# Patient Record
Sex: FEMALE | Race: WHITE | Employment: OTHER | URBAN - METROPOLITAN AREA
[De-identification: names, ages, dates, MRNs, and addresses within clinical notes are randomized per-mention and may not be internally consistent; named-entity substitution may affect disease eponyms.]

---

## 2017-12-07 ENCOUNTER — HOSPITAL ENCOUNTER (EMERGENCY)
Age: 82
Discharge: HOME | End: 2017-12-07
Attending: EMERGENCY MEDICINE
Payer: COMMERCIAL

## 2017-12-07 ENCOUNTER — APPOINTMENT (OUTPATIENT)
Dept: GENERAL RADIOLOGY | Age: 82
End: 2017-12-07
Attending: EMERGENCY MEDICINE
Payer: COMMERCIAL

## 2017-12-07 VITALS
RESPIRATION RATE: 20 BRPM | BODY MASS INDEX: 26.58 KG/M2 | DIASTOLIC BLOOD PRESSURE: 73 MMHG | OXYGEN SATURATION: 98 % | HEIGHT: 63 IN | HEART RATE: 99 BPM | TEMPERATURE: 97.6 F | WEIGHT: 150 LBS | SYSTOLIC BLOOD PRESSURE: 174 MMHG

## 2017-12-07 DIAGNOSIS — S52.501A CLOSED FRACTURE OF DISTAL END OF RIGHT RADIUS, UNSPECIFIED FRACTURE MORPHOLOGY, INITIAL ENCOUNTER: Primary | ICD-10-CM

## 2017-12-07 DIAGNOSIS — S52.614A CLOSED NONDISPLACED FRACTURE OF STYLOID PROCESS OF RIGHT ULNA, INITIAL ENCOUNTER: ICD-10-CM

## 2017-12-07 PROCEDURE — 73110 X-RAY EXAM OF WRIST: CPT

## 2017-12-07 PROCEDURE — 99284 EMERGENCY DEPT VISIT MOD MDM: CPT | Performed by: EMERGENCY MEDICINE

## 2017-12-07 PROCEDURE — 77030012903 HC TAPE CST 3M -A

## 2017-12-07 PROCEDURE — 75810000053 HC SPLINT APPLICATION: Performed by: EMERGENCY MEDICINE

## 2017-12-07 PROCEDURE — 74011250637 HC RX REV CODE- 250/637: Performed by: EMERGENCY MEDICINE

## 2017-12-07 RX ORDER — ACETAMINOPHEN 325 MG/1
650 TABLET ORAL
Status: COMPLETED | OUTPATIENT
Start: 2017-12-07 | End: 2017-12-07

## 2017-12-07 RX ORDER — METOPROLOL SUCCINATE 100 MG/1
50 TABLET, EXTENDED RELEASE ORAL DAILY
COMMUNITY
End: 2017-12-13

## 2017-12-07 RX ORDER — AMLODIPINE BESYLATE 2.5 MG/1
2.5 TABLET ORAL DAILY
COMMUNITY

## 2017-12-07 RX ORDER — MORPHINE SULFATE 15 MG/1
7.5 TABLET ORAL
Qty: 6 TAB | Refills: 0 | Status: SHIPPED | OUTPATIENT
Start: 2017-12-07 | End: 2017-12-13

## 2017-12-07 RX ADMIN — ACETAMINOPHEN 650 MG: 325 TABLET ORAL at 20:53

## 2017-12-08 NOTE — ED NOTES
I have reviewed discharge instructions with the patient. The patient verbalized understanding. Patient left ED via Discharge Method: ambulatory to Home with (son). Opportunity for questions and clarification provided. Patient given 1 scripts. To continue your aftercare when you leave the hospital, you may receive an automated call from our care team to check in on how you are doing. This is a free service and part of our promise to provide the best care and service to meet your aftercare needs.  If you have questions, or wish to unsubscribe from this service please call [phone]. Thank you for Choosing our [facility].

## 2017-12-08 NOTE — DISCHARGE INSTRUCTIONS
Broken Wrist: Care Instructions  Your Care Instructions    Your wrist can break, or fracture, during sports, a fall, or other accidents. The break may happen when your wrist is hit or is used to protect you in a fall. Fractures can range from a small, hairline crack, to a bone or bones broken into two or more pieces. Your treatment depends on how bad the break is. Your doctor may have put your wrist in a cast or splint. This will help keep your wrist stable until your follow-up appointment. It may take weeks or months for your wrist to heal. You can help it heal with care at home. You heal best when you take good care of yourself. Eat a variety of healthy foods, and don't smoke. Follow-up care is a key part of your treatment and safety. Be sure to make and go to all appointments, and call your doctor if you are having problems. It's also a good idea to know your test results and keep a list of the medicines you take. How can you care for yourself at home? · Put ice or a cold pack on your wrist for 10 to 20 minutes at a time. Try to do this every 1 to 2 hours for the next 3 days (when you are awake). Put a thin cloth between the ice and your cast or splint. Keep your cast or splint dry. · Follow the splint or cast care instructions your doctor gives you. If you have a splint, do not take it off unless your doctor tells you to. Be careful not to put the splint on too tight. · Be safe with medicines. Take pain medicines exactly as directed. ¨ If the doctor gave you a prescription medicine for pain, take it as prescribed. ¨ If you are not taking a prescription pain medicine, ask your doctor if you can take an over-the-counter medicine. · Prop up your wrist on pillows when you sit or lie down in the first few days after the injury. Keep your wrist higher than the level of your heart. This will help reduce swelling.   · Move your fingers often to reduce swelling and stiffness, but do not use that hand to grab or carry anything. · Follow instructions for exercises to keep your arm strong. When should you call for help? Call your doctor now or seek immediate medical care if:  ? · You have new or worse pain. ? · Your hand or fingers are cool or pale or change color. ? · Your cast or splint feels too tight. ? · You have tingling, weakness, or numbness in your hand or fingers. ? Watch closely for changes in your health, and be sure to contact your doctor if:  ? · You do not get better as expected. ? · You have problems with your cast or splint. Where can you learn more? Go to http://devon-marivel.info/. Enter [id number] in the search box to learn more about \"Broken Wrist: Care Instructions. \"  Current as of: March 21, 2017  Content Version: 11.4  © 7865-5196 [employer]. Care instructions adapted under license by [employer] (which disclaims liability or warranty for this information). If you have questions about a medical condition or this instruction, always ask your healthcare professional. [address] any warranty or liability for your use of this information.

## 2017-12-08 NOTE — ED PROVIDER NOTES
HPI Comments: Patient's  tripped and fell while using his cane and not her down. She felt her right side and injured her right wrist.  She denies other injuries. No head injury or loss of consciousness. Patient is a 80 y.o. female presenting with wrist pain. The history is provided by the patient. Wrist Pain    This is a new problem. The problem occurs constantly. The problem has not changed since onset. The pain is present in the right wrist. The quality of the pain is described as aching. The pain is moderate. Pertinent negatives include no numbness, no tingling, no back pain and no neck pain. Past Medical History:   Diagnosis Date    Hypertension        History reviewed. No pertinent surgical history. History reviewed. No pertinent family history. Social History     Social History    Marital status:      Spouse name: N/A    Number of children: N/A    Years of education: N/A     Occupational History    Not on file. Social History Main Topics    Smoking status: Not on file    Smokeless tobacco: Not on file    Alcohol use Not on file    Drug use: Not on file    Sexual activity: Not on file     Other Topics Concern    Not on file     Social History Narrative    No narrative on file         ALLERGIES: Review of patient's allergies indicates no known allergies. Review of Systems   HENT: Negative for facial swelling. Respiratory: Negative for shortness of breath. Cardiovascular: Negative for chest pain. Gastrointestinal: Negative for nausea and vomiting. Musculoskeletal: Negative for back pain and neck pain. Neurological: Negative for tingling, weakness, numbness and headaches (no loss of consciousness). Vitals:    12/07/17 1913   BP: 174/73   Pulse: 99   Resp: 20   Temp: 97.6 °F (36.4 °C)   SpO2: 98%   Weight: 68 kg (150 lb)   Height: 5' 3\" (1.6 m)            Physical Exam   Constitutional: She is oriented to person, place, and time.  She appears well-developed and well-nourished. HENT:   Head: Normocephalic and atraumatic. Eyes: Pupils are equal, round, and reactive to light. Neck: Trachea normal. No spinous process tenderness and no muscular tenderness present. Cardiovascular: Normal rate, regular rhythm, normal heart sounds and intact distal pulses. Pulmonary/Chest: Effort normal and breath sounds normal. She exhibits no tenderness. Abdominal: Soft. Bowel sounds are normal. She exhibits no distension. There is no tenderness. There is no rebound and no guarding. Musculoskeletal: Normal range of motion. She exhibits tenderness (tenderness over distal radius and ulna with mildswelling noted. Neurovascularly intact. 2+ radial pulse. ). She exhibits no edema. Cervical back: She exhibits tenderness. Neurological: She is alert and oriented to person, place, and time. She has normal strength. No sensory deficit. GCS eye subscore is 4. GCS verbal subscore is 5. GCS motor subscore is 6. Skin: Skin is warm and dry. Nursing note and vitals reviewed. MDM  Number of Diagnoses or Management Options  Diagnosis management comments: Right distal radius fracture and ulnar styloid fracture. Patient splinted in a sugar tong splint. Patient to follow-up with Dr. Radha Schultz or with [facility] orthopedics. I spoke with Dr. Castillo Clark regarding her follow-up. She is seen Dr. Radha Schultz in the past or at least a family member has name may prefer to go there instead. Amount and/or Complexity of Data Reviewed  Tests in the radiology section of CPT®: ordered and reviewed (Xr Wrist Rt Ap/lat/obl Min 3v    Result Date: 12/7/2017  RIGHT WRIST SERIES HISTORY: Severe right wrist pain after fall today FINDINGS: There is comminuted fracture of the distal radius with extension into the radiocarpal joint. There is an accompanying fracture of the ulnar styloid.      IMPRESSION: Fractures of the distal radius and ulnar styloid.    )      ED Course Procedures

## 2017-12-13 ENCOUNTER — HOSPITAL ENCOUNTER (OUTPATIENT)
Dept: SURGERY | Age: 82
Discharge: HOME | End: 2017-12-13

## 2017-12-13 VITALS — HEIGHT: 60 IN | WEIGHT: 140 LBS | BODY MASS INDEX: 27.48 KG/M2

## 2017-12-13 RX ORDER — ASPIRIN 81 MG/1
81 TABLET ORAL DAILY
COMMUNITY

## 2017-12-13 RX ORDER — METOPROLOL SUCCINATE 50 MG/1
50 TABLET, EXTENDED RELEASE ORAL DAILY
COMMUNITY

## 2017-12-13 RX ORDER — CHOLECALCIFEROL (VITAMIN D3) 125 MCG
2000 CAPSULE ORAL DAILY
COMMUNITY

## 2017-12-13 NOTE — PERIOP NOTES
Patient verified name, , and surgery as listed in Connect Care. Patient provided medical/health information and PTA medications to the best of their ability. TYPE  CASE: 1B      Orders: No Orders noted in connect. Labs per surgeon:  None   Labs per anesthesia protocol: None  EKG  :  NA    Instructed patient to continue previous medications as prescribed prior to surgery unless otherwise directed and to take the following medications the day of surgery according to anesthesia guidelines : Norvasc,Aspirin 81mg, Toprol . Instructed patient to hold  the following medications: Vitamin D and CO Q10. Patient instructed on the following:  Arrive at main  entrance, time of arrival to be called the day before by 1700. Responsible adult must drive patient to and from the hospital, stay during surgery, and  patient will need supervision 24 hours after anesthesia  NPO after midnight including gum, mints and ice chips. Shower the night before and the morning of surgery with a non-moisturizing soap. Leave all valuables at home. Bring insurance card and ID. No jewelry or body piercings on the dos. No perfumes, oil, powder, colognes, makeup or  lotions on the skin. Patient teach back successful and patient demonstrates knowledge of instruction.

## 2017-12-15 ENCOUNTER — ANESTHESIA EVENT (OUTPATIENT)
Dept: SURGERY | Age: 82
End: 2017-12-15
Payer: COMMERCIAL

## 2017-12-16 ENCOUNTER — APPOINTMENT (OUTPATIENT)
Dept: GENERAL RADIOLOGY | Age: 82
End: 2017-12-16
Attending: ORTHOPAEDIC SURGERY
Payer: COMMERCIAL

## 2017-12-16 ENCOUNTER — ANESTHESIA (OUTPATIENT)
Dept: SURGERY | Age: 82
End: 2017-12-16
Payer: COMMERCIAL

## 2017-12-16 ENCOUNTER — HOSPITAL ENCOUNTER (OUTPATIENT)
Age: 82
Setting detail: OUTPATIENT SURGERY
Discharge: HOME | End: 2017-12-16
Attending: ORTHOPAEDIC SURGERY | Admitting: ORTHOPAEDIC SURGERY
Payer: COMMERCIAL

## 2017-12-16 VITALS
BODY MASS INDEX: 32.26 KG/M2 | SYSTOLIC BLOOD PRESSURE: 139 MMHG | OXYGEN SATURATION: 95 % | DIASTOLIC BLOOD PRESSURE: 58 MMHG | RESPIRATION RATE: 17 BRPM | TEMPERATURE: 97.8 F | HEIGHT: 60 IN | HEART RATE: 70 BPM | WEIGHT: 164.31 LBS

## 2017-12-16 DIAGNOSIS — S62.101A CLOSED FRACTURE OF RIGHT WRIST, INITIAL ENCOUNTER: Primary | ICD-10-CM

## 2017-12-16 PROCEDURE — C1713 ANCHOR/SCREW BN/BN,TIS/BN: HCPCS | Performed by: ORTHOPAEDIC SURGERY

## 2017-12-16 PROCEDURE — 74011000250 HC RX REV CODE- 250

## 2017-12-16 PROCEDURE — 74011250637 HC RX REV CODE- 250/637: Performed by: ANESTHESIOLOGY

## 2017-12-16 PROCEDURE — 77030002916 HC SUT ETHLN J&J -A: Performed by: ORTHOPAEDIC SURGERY

## 2017-12-16 PROCEDURE — 77030018836 HC SOL IRR NACL ICUM -A: Performed by: ORTHOPAEDIC SURGERY

## 2017-12-16 PROCEDURE — 76942 ECHO GUIDE FOR BIOPSY: CPT | Performed by: ORTHOPAEDIC SURGERY

## 2017-12-16 PROCEDURE — 74011250636 HC RX REV CODE- 250/636: Performed by: ANESTHESIOLOGY

## 2017-12-16 PROCEDURE — 74011250636 HC RX REV CODE- 250/636: Performed by: NURSE PRACTITIONER

## 2017-12-16 PROCEDURE — 77030011884 HC TAPE CST PLSTR BSNM -A: Performed by: ORTHOPAEDIC SURGERY

## 2017-12-16 PROCEDURE — 74011250636 HC RX REV CODE- 250/636

## 2017-12-16 PROCEDURE — 77030011640 HC PAD GRND REM COVD -A: Performed by: ORTHOPAEDIC SURGERY

## 2017-12-16 PROCEDURE — 76210000006 HC OR PH I REC 0.5 TO 1 HR: Performed by: ORTHOPAEDIC SURGERY

## 2017-12-16 PROCEDURE — 73100 X-RAY EXAM OF WRIST: CPT

## 2017-12-16 PROCEDURE — 77030000031 HC BIT DRL QC SYNT -C: Performed by: ORTHOPAEDIC SURGERY

## 2017-12-16 PROCEDURE — 73110 X-RAY EXAM OF WRIST: CPT

## 2017-12-16 PROCEDURE — 77030003602 HC NDL NRV BLK BBMI -B: Performed by: ANESTHESIOLOGY

## 2017-12-16 PROCEDURE — 76210000020 HC REC RM PH II FIRST 0.5 HR: Performed by: ORTHOPAEDIC SURGERY

## 2017-12-16 PROCEDURE — 76010010054 HC POST OP PAIN BLOCK: Performed by: ORTHOPAEDIC SURGERY

## 2017-12-16 PROCEDURE — 77030002933 HC SUT MCRYL J&J -A: Performed by: ORTHOPAEDIC SURGERY

## 2017-12-16 PROCEDURE — 76060000033 HC ANESTHESIA 1 TO 1.5 HR: Performed by: ORTHOPAEDIC SURGERY

## 2017-12-16 PROCEDURE — 76010000161 HC OR TIME 1 TO 1.5 HR INTENSV-TIER 1: Performed by: ORTHOPAEDIC SURGERY

## 2017-12-16 DEVICE — SCREW BNE L12MM DIA24MM CORT S STL ST T8 STARDRV RECESS: Type: IMPLANTABLE DEVICE | Site: RADIUS | Status: FUNCTIONAL

## 2017-12-16 DEVICE — SCREW BNE L16MM DIA2.4MM S STL ST LOK FULL THRD T8 STARDRV: Type: IMPLANTABLE DEVICE | Site: RADIUS | Status: FUNCTIONAL

## 2017-12-16 DEVICE — SCREW BNE L22MM DIA2.4MM S STL ST LOK FULL THRD T8 STARDRV: Type: IMPLANTABLE DEVICE | Site: RADIUS | Status: FUNCTIONAL

## 2017-12-16 DEVICE — SCREW BNE L12MM DIA2.4MM S STL ST LOK FULL THRD T8 STARDRV: Type: IMPLANTABLE DEVICE | Site: RADIUS | Status: FUNCTIONAL

## 2017-12-16 DEVICE — IMPLANTABLE DEVICE: Type: IMPLANTABLE DEVICE | Site: RADIUS | Status: FUNCTIONAL

## 2017-12-16 DEVICE — SCREW BNE L18MM DIA2.4MM S STL ST LOK FULL THRD T8 STARDRV: Type: IMPLANTABLE DEVICE | Site: RADIUS | Status: FUNCTIONAL

## 2017-12-16 RX ORDER — ROPIVACAINE HYDROCHLORIDE 5 MG/ML
INJECTION, SOLUTION EPIDURAL; INFILTRATION; PERINEURAL AS NEEDED
Status: DISCONTINUED | OUTPATIENT
Start: 2017-12-16 | End: 2017-12-16 | Stop reason: HOSPADM

## 2017-12-16 RX ORDER — CEFAZOLIN SODIUM/WATER 2 G/20 ML
2 SYRINGE (ML) INTRAVENOUS
Status: COMPLETED | OUTPATIENT
Start: 2017-12-16 | End: 2017-12-16

## 2017-12-16 RX ORDER — LIDOCAINE HYDROCHLORIDE 20 MG/ML
INJECTION, SOLUTION EPIDURAL; INFILTRATION; INTRACAUDAL; PERINEURAL AS NEEDED
Status: DISCONTINUED | OUTPATIENT
Start: 2017-12-16 | End: 2017-12-16 | Stop reason: HOSPADM

## 2017-12-16 RX ORDER — SODIUM CHLORIDE 0.9 % (FLUSH) 0.9 %
5-10 SYRINGE (ML) INJECTION AS NEEDED
Status: DISCONTINUED | OUTPATIENT
Start: 2017-12-16 | End: 2017-12-16 | Stop reason: HOSPADM

## 2017-12-16 RX ORDER — MIDAZOLAM HYDROCHLORIDE 1 MG/ML
2 INJECTION, SOLUTION INTRAMUSCULAR; INTRAVENOUS ONCE
Status: DISCONTINUED | OUTPATIENT
Start: 2017-12-16 | End: 2017-12-16 | Stop reason: HOSPADM

## 2017-12-16 RX ORDER — ACETAMINOPHEN 500 MG
1000 TABLET ORAL ONCE
Status: COMPLETED | OUTPATIENT
Start: 2017-12-16 | End: 2017-12-16

## 2017-12-16 RX ORDER — ALBUTEROL SULFATE 0.83 MG/ML
2.5 SOLUTION RESPIRATORY (INHALATION) AS NEEDED
Status: DISCONTINUED | OUTPATIENT
Start: 2017-12-16 | End: 2017-12-16 | Stop reason: HOSPADM

## 2017-12-16 RX ORDER — OXYCODONE HYDROCHLORIDE 5 MG/1
10 TABLET ORAL
Status: DISCONTINUED | OUTPATIENT
Start: 2017-12-16 | End: 2017-12-16 | Stop reason: HOSPADM

## 2017-12-16 RX ORDER — SODIUM CHLORIDE, SODIUM LACTATE, POTASSIUM CHLORIDE, CALCIUM CHLORIDE 600; 310; 30; 20 MG/100ML; MG/100ML; MG/100ML; MG/100ML
100 INJECTION, SOLUTION INTRAVENOUS CONTINUOUS
Status: DISCONTINUED | OUTPATIENT
Start: 2017-12-16 | End: 2017-12-16 | Stop reason: HOSPADM

## 2017-12-16 RX ORDER — ONDANSETRON 2 MG/ML
4 INJECTION INTRAMUSCULAR; INTRAVENOUS
Status: DISCONTINUED | OUTPATIENT
Start: 2017-12-16 | End: 2017-12-16 | Stop reason: HOSPADM

## 2017-12-16 RX ORDER — HYDROMORPHONE HYDROCHLORIDE 2 MG/ML
0.5 INJECTION, SOLUTION INTRAMUSCULAR; INTRAVENOUS; SUBCUTANEOUS
Status: DISCONTINUED | OUTPATIENT
Start: 2017-12-16 | End: 2017-12-16 | Stop reason: HOSPADM

## 2017-12-16 RX ORDER — SODIUM CHLORIDE 0.9 % (FLUSH) 0.9 %
5-10 SYRINGE (ML) INJECTION EVERY 8 HOURS
Status: DISCONTINUED | OUTPATIENT
Start: 2017-12-16 | End: 2017-12-16 | Stop reason: HOSPADM

## 2017-12-16 RX ORDER — PROPOFOL 10 MG/ML
INJECTION, EMULSION INTRAVENOUS
Status: DISCONTINUED | OUTPATIENT
Start: 2017-12-16 | End: 2017-12-16 | Stop reason: HOSPADM

## 2017-12-16 RX ORDER — HYDROMORPHONE HYDROCHLORIDE 2 MG/ML
0.5 INJECTION, SOLUTION INTRAMUSCULAR; INTRAVENOUS; SUBCUTANEOUS
Status: DISCONTINUED | OUTPATIENT
Start: 2017-12-16 | End: 2017-12-16 | Stop reason: SDUPTHER

## 2017-12-16 RX ORDER — LIDOCAINE HYDROCHLORIDE 10 MG/ML
0.1 INJECTION INFILTRATION; PERINEURAL AS NEEDED
Status: DISCONTINUED | OUTPATIENT
Start: 2017-12-16 | End: 2017-12-16 | Stop reason: HOSPADM

## 2017-12-16 RX ORDER — SODIUM CHLORIDE, SODIUM LACTATE, POTASSIUM CHLORIDE, CALCIUM CHLORIDE 600; 310; 30; 20 MG/100ML; MG/100ML; MG/100ML; MG/100ML
1000 INJECTION, SOLUTION INTRAVENOUS CONTINUOUS
Status: DISCONTINUED | OUTPATIENT
Start: 2017-12-16 | End: 2017-12-16 | Stop reason: HOSPADM

## 2017-12-16 RX ORDER — PROPOFOL 10 MG/ML
INJECTION, EMULSION INTRAVENOUS AS NEEDED
Status: DISCONTINUED | OUTPATIENT
Start: 2017-12-16 | End: 2017-12-16 | Stop reason: HOSPADM

## 2017-12-16 RX ORDER — SODIUM CHLORIDE, SODIUM LACTATE, POTASSIUM CHLORIDE, CALCIUM CHLORIDE 600; 310; 30; 20 MG/100ML; MG/100ML; MG/100ML; MG/100ML
75 INJECTION, SOLUTION INTRAVENOUS
Status: DISCONTINUED | OUTPATIENT
Start: 2017-12-16 | End: 2017-12-16 | Stop reason: HOSPADM

## 2017-12-16 RX ADMIN — PROPOFOL 100 MG: 10 INJECTION, EMULSION INTRAVENOUS at 08:19

## 2017-12-16 RX ADMIN — SODIUM CHLORIDE, SODIUM LACTATE, POTASSIUM CHLORIDE, AND CALCIUM CHLORIDE: 600; 310; 30; 20 INJECTION, SOLUTION INTRAVENOUS at 08:10

## 2017-12-16 RX ADMIN — ROPIVACAINE HYDROCHLORIDE 30 ML: 5 INJECTION, SOLUTION EPIDURAL; INFILTRATION; PERINEURAL at 07:30

## 2017-12-16 RX ADMIN — PROPOFOL 250 MCG/KG/MIN: 10 INJECTION, EMULSION INTRAVENOUS at 08:19

## 2017-12-16 RX ADMIN — Medication 2 G: at 08:21

## 2017-12-16 RX ADMIN — ACETAMINOPHEN 1000 MG: 500 TABLET, FILM COATED ORAL at 06:45

## 2017-12-16 RX ADMIN — SODIUM CHLORIDE, SODIUM LACTATE, POTASSIUM CHLORIDE, AND CALCIUM CHLORIDE 75 ML/HR: 600; 310; 30; 20 INJECTION, SOLUTION INTRAVENOUS at 06:41

## 2017-12-16 RX ADMIN — LIDOCAINE HYDROCHLORIDE 20 MG: 20 INJECTION, SOLUTION EPIDURAL; INFILTRATION; INTRACAUDAL; PERINEURAL at 08:19

## 2017-12-16 NOTE — DISCHARGE INSTRUCTIONS
NON-WEIGHT BEARING RIGHT ARM  NO LIFTING WITH RIGHT ARM  ELEVATE RIGHT ARM  RIGHT ARM SLING  MOVE FINGERS FREELY  KEEP DRESSING CLEAN AND DRY  FOLLOW-UP APPT WITH DR Camila Patterson - 12/26/17 @ 8:30 AM  PRESCRIPTION FOR OXYCODONE 5 MG GIVEN TO PATIENT    After general anesthesia or intravenous sedation, for 24 hours or while taking prescription Narcotics:  · Limit your activities  · Do not drive and operate hazardous machinery  · Do not make important personal or business decisions  · Do  not drink alcoholic beverages  · If you have not urinated within 8 hours after discharge, please contact your surgeon on call. *  Please give a list of your current medications to your Primary Care Provider. *  Please update this list whenever your medications are discontinued, doses are      changed, or new medications (including over-the-counter products) are added. *  Please carry medication information at all times in case of emergency situations. These are general instructions for a healthy lifestyle:  No smoking/ No tobacco products/ Avoid exposure to second hand smoke  Surgeon General's Warning:  Quitting smoking now greatly reduces serious risk to your health. Obesity, smoking, and sedentary lifestyle greatly increases your risk for illness  A healthy diet, regular physical exercise & weight monitoring are important for maintaining a healthy lifestyle    You may be retaining fluid if you have a history of heart failure or if you experience any of the following symptoms:  Weight gain of 3 pounds or more overnight or 5 pounds in a week, increased swelling in our hands or feet or shortness of breath while lying flat in bed. Please call your doctor as soon as you notice any of these symptoms; do not wait until your next office visit.     Recognize signs and symptoms of STROKE:    F-face looks uneven    A-arms unable to move or move unevenly    S-speech slurred or non-existent    T-time-call 911 as soon as signs and symptoms begin-DO NOT go       Back to bed or wait to see if you get better-TIME IS BRAIN.

## 2017-12-16 NOTE — H&P
Outpatient Surgery History and Physical      Tanisha Hilliard was seen and examined. Chief Complaint:   RIGHT WRIST PAIN     Physical Exam:   There were no vitals taken for this visit. Heart:   Regular rhythm      Lungs:  Are clear      History:  Past Medical History:   Diagnosis Date    Arthritis     Hypertension     controlled well with meds    Right arm pain       Past Surgical History:   Procedure Laterality Date    HX OPEN CHOLECYSTECTOMY       Family History   Problem Relation Age of Onset    Heart Disease Mother     Hypertension Mother     Heart Disease Father     Hypertension Father     Stroke Father     Cancer Sister      Lymphoma    Cancer Brother      Lung CA    Heart Disease Sister     Stroke Brother       Social History     Occupational History    Not on file. Social History Main Topics    Smoking status: Never Smoker    Smokeless tobacco: Never Used    Alcohol use No    Drug use: No    Sexual activity: Not on file       Allergies: Reviewed per EMR  No Known Allergies    Medications:    Prior to Admission medications    Medication Sig Start Date End Date Taking? Authorizing Provider   garlic cap Take  by mouth. Indications: fruit and veggie vitamin with garlic- held 22/05/74    Historical Provider   acetaminophen (TYLENOL EXTRA STRENGTH) 500 mg tablet Take 500 mg by mouth every six (6) hours as needed for Pain. Historical Provider   metoprolol succinate (TOPROL XL) 50 mg XL tablet Take 50 mg by mouth daily. Indications: am- take on the [employer] Provider   UBIDECARENONE (CO Q-10 PO) Take 1 Tab by mouth daily. Indications: held 12/13/17    Historical Provider   aspirin delayed-release 81 mg tablet Take 81 mg by mouth daily. Indications: am- take on the [employer] Provider   cholecalciferol, vitamin D3, (VITAMIN D3) 2,000 unit tab Take 2,000 Units by mouth daily.  Indications: held 12/13/17    Historical Provider   amLODIPine (NORVASC) 2.5 mg tablet Take 2.5 mg by mouth daily. Indications: am- take on the [facility]        The surgery is planned for the OPEN REDUCTION INTERNAL FIXATION OF RIGHT WRIST. The patient is here today for outpatient surgery. I have examined the patient, no changes are noted in the patient's medical status. Necessity for the procedure/care is still present and the history and physical above is current.       Signed By: Alex Mar NP     December 15, 2017 10:12 PM

## 2017-12-16 NOTE — ANESTHESIA PREPROCEDURE EVALUATION
Anesthetic History   No history of anesthetic complications            Review of Systems / Medical History  Patient summary reviewed and pertinent labs reviewed    Pulmonary  Within defined limits                 Neuro/Psych   Within defined limits           Cardiovascular    Hypertension              Exercise tolerance: >4 METS     GI/Hepatic/Renal                Endo/Other        Obesity and arthritis     Other Findings   Comments: Works at [employer]         Physical Exam    Airway  Mallampati: II  TM Distance: 4 - 6 cm  Neck ROM: normal range of motion   Mouth opening: Normal     Cardiovascular    Rhythm: regular  Rate: normal  Peripheral edema (+1 in legs and calves)    Pertinent negatives: No murmur   Dental  No notable dental hx       Pulmonary  Breath sounds clear to auscultation               Abdominal         Other Findings            Anesthetic Plan    ASA: 2  Anesthesia type: total IV anesthesia      Post-op pain plan if not by surgeon: peripheral nerve block single    Induction: Intravenous  Anesthetic plan and risks discussed with: Patient and Son / Daughter

## 2017-12-16 NOTE — ANESTHESIA POSTPROCEDURE EVALUATION
Post-Anesthesia Evaluation and Assessment    Patient: Candida Barney MRN: [id number]  SSN: [id number]    YOB: 1934  Age: 80 y.o. Sex: female       Cardiovascular Function/Vital Signs  Visit Vitals    /73    Pulse 73    Temp 36.6 °C (97.8 °F)    Resp 16    Ht 5' (1.524 m)    Wt 74.5 kg (164 lb 5 oz)    SpO2 93%    BMI 32.09 kg/m2       Patient is status post total IV anesthesia anesthesia for Procedure(s):  RIGHT 2 INTRA ARTICULAR DISTAL RADIUS OPEN REDUCTION INTERNAL FIXATION/ CHOICE. Nausea/Vomiting: None    Postoperative hydration reviewed and adequate. Pain:  Pain Scale 1: Numeric (0 - 10) (12/16/17 0931)  Pain Intensity 1: 0 (12/16/17 0931)   Managed    Neurological Status:   Neuro (WDL): Within Defined Limits (12/16/17 0922)   At baseline    Mental Status and Level of Consciousness: Arousable    Pulmonary Status:   O2 Device: Nasal cannula (12/16/17 0922)   Adequate oxygenation and airway patent    Complications related to anesthesia: None    Post-anesthesia assessment completed.  No concerns    Signed By: Kian Ponce MD     December 16, 2017

## 2017-12-16 NOTE — IP AVS SNAPSHOT
[address] [facility] 
 
 
 [address] [facility] [address] 
[phone] Patient: Mackenzie Patterson MRN: [id number] EWY:[birth year] About your hospitalization You were admitted on:  December 16, 2017 You last received care in the:  [facility] PACU You were discharged on:  December 16, 2017 Why you were hospitalized Your primary diagnosis was:  Not on File Things You Need To Do (next 8 weeks) Follow up with Isabelle Fabian MD  
  
Phone:  [phone] Where:  [address] [facility], [address] [facility] [address] Follow up with Star Newman MD  
  
Phone:  [phone] Where:  [address] [facility] , [address], [facility] [address] Discharge Orders Procedure Order Date Status Priority Quantity Spec Type Associated Dx CALL YOUR DOCTOR For: Severe uncontrolled pain. , Redness, tenderness, or signs of infection. 12/16/17 0750 Normal Routine 1  Closed fracture of right wrist, initial encounter [[id number]] Questions: For:  Severe uncontrolled pain. For:  Redness, tenderness, or signs of infection. ACTIVITY AFTER DISCHARGE Patient should: Restrict weight bearing, Restrict lifting 12/16/17 0750 Normal Routine 1  Closed fracture of right wrist, initial encounter [[id number]] Questions: Patient should:  Restrict weight bearing Patient should:  Restrict lifting DIET REGULAR No added salt 12/16/17 0750 Normal Routine 1  Closed fracture of right wrist, initial encounter [[id number]] Questions: Additional options:  No added salt DRESSING, DO NOT REMOVE 12/16/17 0750 Normal Routine 1  Closed fracture of right wrist, initial encounter [[id number]] Comments:  Keep clean, dry and intact until next clinic visit. A check meenu indicates which time of day the medication should be taken. My Medications TAKE these medications as instructed Instructions Each Dose to Equal  
 Morning Noon Evening Bedtime  
 amLODIPine 2.5 mg tablet Commonly known as:  Jacob Rojo Your last dose was: Your next dose is: Take 2.5 mg by mouth daily. Indications: am- take on the [facility] 2.5 mg  
    
   
   
   
  
 aspirin delayed-release 81 mg tablet Your last dose was: Your next dose is: Take 81 mg by mouth daily. Indications: am- take on the [facility] 81 mg  
    
   
   
   
  
 CO Q-10 PO Your last dose was: Your next dose is: Take 1 Tab by mouth daily. Indications: held 12/13/17  
 1 Tab  
    
   
   
   
  
 garlic Cap Your last dose was: Your next dose is: Take  by mouth. Indications: fruit and veggie vitamin with garlic- held 65/21/09 TOPROL XL 50 mg XL tablet Generic drug:  metoprolol succinate Your last dose was: Your next dose is: Take 50 mg by mouth daily. Indications: am- take on the [facility] 50 mg  
    
   
   
   
  
 TYLENOL EXTRA STRENGTH 500 mg tablet Generic drug:  acetaminophen Your last dose was: Your next dose is: Take 500 mg by mouth every six (6) hours as needed for Pain. 500 mg  
    
   
   
   
  
 VITAMIN D3 2,000 unit Tab Generic drug:  cholecalciferol (vitamin D3) Your last dose was: Your next dose is: Take 2,000 Units by mouth daily. Indications: held 12/13/17  
 2000 Units Discharge Instructions NON-WEIGHT BEARING RIGHT ARM 
NO LIFTING WITH RIGHT ARM 
ELEVATE RIGHT ARM 
RIGHT ARM SLING 
MOVE FINGERS FREELY 
KEEP DRESSING CLEAN AND DRY FOLLOW-UP APPT WITH DR Mabel Neal - 12/26/17 @ 8:30 AM 
PRESCRIPTION FOR OXYCODONE 5 MG GIVEN TO PATIENT After general anesthesia or intravenous sedation, for 24 hours or while taking prescription Narcotics: · Limit your activities · Do not drive and operate hazardous machinery · Do not make important personal or business decisions · Do  not drink alcoholic beverages · If you have not urinated within 8 hours after discharge, please contact your surgeon on call. *  Please give a list of your current medications to your Primary Care Provider. *  Please update this list whenever your medications are discontinued, doses are 
    changed, or new medications (including over-the-counter products) are added. *  Please carry medication information at all times in case of emergency situations. These are general instructions for a healthy lifestyle: No smoking/ No tobacco products/ Avoid exposure to second hand smoke Surgeon General's Warning:  Quitting smoking now greatly reduces serious risk to your health. Obesity, smoking, and sedentary lifestyle greatly increases your risk for illness A healthy diet, regular physical exercise & weight monitoring are important for maintaining a healthy lifestyle You may be retaining fluid if you have a history of heart failure or if you experience any of the following symptoms:  Weight gain of 3 pounds or more overnight or 5 pounds in a week, increased swelling in our hands or feet or shortness of breath while lying flat in bed. Please call your doctor as soon as you notice any of these symptoms; do not wait until your next office visit. Recognize signs and symptoms of STROKE: 
 
F-face looks uneven A-arms unable to move or move unevenly S-speech slurred or non-existent T-time-call 911 as soon as signs and symptoms begin-DO NOT go Back to bed or wait to see if you get better-TIME IS BRAIN. Introducing [facility]! Alexander Spencer introduces [employer] patient portal. Now you can access parts of your medical record, email your doctor's office, and request medication refills online. 1. In your internet browser, go to [email or web address] 2. Click on the First Time User? Click Here link in the Sign In box. You will see the New Member Sign Up page. 3. Enter your [email or web address] Access Code exactly as it appears below. You will not need to use this code after youve completed the sign-up process. If you do not sign up before the expiration date, you must request a new code. · [email or web address] Access Code: [email or web address] Expires: 3/7/2018  6:51 PM 
 
4. Enter the last four digits of your Social Security Number (xxxx) and Date of Birth (mm/dd/yyyy) as indicated and click Submit. You will be taken to the next sign-up page. 5. Create a [email or web address] ID. This will be your [email or web address] login ID and cannot be changed, so think of one that is secure and easy to remember. 6. Create a [email or web address] password. You can change your password at any time. 7. Enter your Password Reset Question and Answer. This can be used at a later time if you forget your password. 8. Enter your e-mail address. You will receive e-mail notification when new information is available in [address] 9. Click Sign Up. You can now view and download portions of your medical record. 10. Click the Download Summary menu link to download a portable copy of your medical information. If you have questions, please visit the Frequently Asked Questions section of the [email or web address] website. Remember, [email or web address] is NOT to be used for urgent needs. For medical emergencies, dial 911. Now available from your iPhone and Android! Providers Seen During Your Hospitalization Provider Specialty Primary office phone Latonya Silva MD Orthopedic Surgery [phone] Your Primary Care Physician (PCP) Primary Care Physician Office Phone Office Fax Sumaya Thakkar [phone] You are allergic to the following No active allergies Recent Documentation Height Weight BMI OB Status Smoking Status 1.524 m 74.5 kg 32.09 kg/m2 Postmenopausal Never Smoker Emergency Contacts Name Discharge Info Relation Home Work Mobile Dimmary Figueroa  Son [22] [phone] Patient Belongings The following personal items are in your possession at time of discharge: 
  Dental Appliances: None         Home Medications: None   Jewelry: None  Clothing: Undergarments, Footwear, Shirt    Other Valuables: None Please provide this summary of care documentation to your next provider. Signatures-by signing, you are acknowledging that this After Visit Summary has been reviewed with you and you have received a copy. Patient Signature:  ____________________________________________________________ Date:  ____________________________________________________________  
  
[facility] Provider Signature:  ____________________________________________________________ Date:  ____________________________________________________________

## 2017-12-16 NOTE — ANESTHESIA PROCEDURE NOTES
Peripheral Block    Start time: 12/16/2017 7:23 AM  End time: 12/16/2017 7:30 AM  Performed by: Mann Luu  Authorized by: Mann Luu       Pre-procedure:    Indications: at surgeon's request and post-op pain management    Preanesthetic Checklist: patient identified, risks and benefits discussed, site marked, timeout performed, anesthesia consent given and patient being monitored    Timeout Time: 07:23          Block Type:   Block Type:  Axillary  Laterality:  Right  Monitoring:  Continuous pulse ox, frequent vital sign checks, heart rate, oxygen and responsive to questions  Injection Technique:  Single shot  Procedures: ultrasound guided    Patient Position: supine  Prep: chlorhexidine    Location:  Axilla  Needle Type:  Stimuplex  Needle Gauge:  20 G  Needle Localization:  Ultrasound guidance  Medication Injected:  0.5%  ropivacaine  Volume (mL):  30    Assessment:  Number of attempts:  1  Injection Assessment:  Incremental injection every 5 mL, local visualized surrounding nerve on ultrasound, negative aspiration for blood, no paresthesia, no intravascular symptoms and ultrasound image on chart  Patient tolerance:  Patient tolerated the procedure well with no immediate complications

## 2017-12-17 NOTE — BRIEF OP NOTE
BRIEF OPERATIVE NOTE    Date of Procedure: 12/16/2017   Preoperative Diagnosis: Closed die punch fracture distal radius, right, initial encounter [S57.270A]  Postoperative Diagnosis: Right 2 part intra articular distal radius fracture    Procedure(s):  RIGHT 2 INTRA ARTICULAR DISTAL RADIUS OPEN REDUCTION INTERNAL FIXATION/ CHOICE  Surgeon(s) and Role:     * Ashtyn Amaro MD - Primary         Assistant Staff:       Surgical Staff:  Circ-1: Uriel Hodges RN  Radiology Technician: Jaquelin Travis, RT, R, CT  Scrub Tech-1: Juanita Drake  Scrub Tech-2: Nolberto Christensen  Scrub Tech-3: Gamaliel Santos  Event Time In   Incision Start 8021   Incision Close 0913     Anesthesia: Regional   Estimated Blood Loss: tourniquet  Specimens: * No specimens in log *   Findings: none   Complications: none  Implants:   Implant Name Type Inv.  Item Serial No.  Lot No. LRB No. Used Action   SCR BNE LCK ST STARDRV 2.4X12 -- SS - [id number]  SCR BNE LCK ST STARDRV 2.4X12 -- SS  [employer] [place] [id number] Right 3 Implanted   SCR BNE LCK ST STARDRV 2.4X16 -- SS - [id number]  SCR BNE LCK ST STARDRV 2.4X16 -- SS  [employer] [place] [id number] Right 1 Implanted   SCR BNE LCK ST STARDRV 2.4X18 -- SS - [id number]  SCR BNE LCK ST STARDRV 2.4X18 -- SS  [employer] [place] [id number] Right 1 Implanted   SCR BNE LCK ST STARDRV 2.4X22 -- SS - [id number]  SCR BNE LCK ST STARDRV 2.4X22 -- SS  [employer] [place] [id number] Right 3 Implanted   SCR BNE CRTX ST T8 2.4X12MM SS --  - [id number]  SCR BNE CRTX ST T8 2.4X12MM SS --   [employer] [place] [id number] Right 1 Implanted   PLATE RAD DSTL 6H FV/3C SHFT/R --  - [id number]   PLATE RAD DSTL 6H UY/6S SHFT/R --    [facility] [id number] Right 1 Implanted

## 2017-12-26 NOTE — OP NOTES
[address] REPORT    Coleen Medeiros  MR#: [id number]  : 1934  ACCOUNT #: [de-identified]   DATE OF SERVICE: 2017    PREOPERATIVE DIAGNOSIS:  Right 2-part intraarticular distal radius fracture. POSTOPERATIVE DIAGNOSIS:  Right 2-part intraarticular distal radius fracture. PROCEDURE:  Open reduction and internal fixation of a right 2-part intra-articular distal radius fracture. OPERATING TEAM: Medardo Acuna. Erica Medrano MD     ANESTHESIA:  Block. SPECIMENS REMOVED:      PROCEDURE:  The patient brought to the operative suite, placed in the supine position. After adequate anesthesia achieved in the form of a block, the patient had a tourniquet applied to the right arm over adequate padding and Sof-Rol, preset to a level of 255 mmHg. Right upper extremity was then prepped and draped in the usual sterile fashion. It was elevated and exsanguinated by gravity, tourniquet was inflated. Incision was made over the volar aspect of the distal forearm along the line of the flexor carpi radialis tendon. Hemostasis achieved with Bovie cautery. Fascia incised along the line of the skin incision. The pronator quadratus and flexor pollicis longus were taken down off the radius and reflected ulnarward. The brachioradialis was released from the radial styloid. The underlying fracture is a 2-part intra-articular fracture. It is cleaned of soft tissue debris and reduced and provisionally fixed with K-wires. It is then secured with a volarly applied Synthes distal radial locking plate. Plate is taken to the bone proximally using a 2.7 cortical screw. It is then secured distally with locking screws and an additional 3.5 locking screws were then placed proximally with the original cortical screws removed. AP and lateral fluoroscopic images with an oblique confirmed the fracture reduced anatomically and the hardware was in good position.   The wound was then irrigated with normal saline and closed in layers. A sterile compressive dressing and sugar-tong splint were applied. Tourniquet was deflated and the patient was transferred to recovery room, alert and oriented under the care of anesthesia. ESTIMATED BLOOD LOSS:  Minimal.    FLUIDS:  See Anesthesia record. CLOSURE:  Primary. COMPLICATIONS:  None. MD Malou Burton / Nina Aquino  D: 12/25/2017 19:26     T: 12/25/2017 22:26  JOB #: [id number]

## 2017-12-28 ENCOUNTER — HOSPITAL ENCOUNTER (OUTPATIENT)
Dept: PHYSICAL THERAPY | Age: 82
Discharge: HOME | End: 2017-12-28
Payer: COMMERCIAL

## 2017-12-28 PROCEDURE — G8984 CARRY CURRENT STATUS: HCPCS

## 2017-12-28 PROCEDURE — 97162 PT EVAL MOD COMPLEX 30 MIN: CPT

## 2017-12-28 PROCEDURE — G8985 CARRY GOAL STATUS: HCPCS

## 2017-12-28 PROCEDURE — 97110 THERAPEUTIC EXERCISES: CPT

## 2017-12-28 NOTE — PROGRESS NOTES
Ambulatory/Rehab Services H2 Model Falls Risk Assessment    Risk Factor Pts. ·   Confusion/Disorientation/Impulsivity  []    4 ·   Symptomatic Depression  []   2 ·   Altered Elimination  []   1 ·   Dizziness/Vertigo  []   1 ·   Gender (Male)  []   1 ·   Any administered antiepileptics (anticonvulsants):  []   2 ·   Any administered benzodiazepines:  []   1 ·   Visual Impairment (specify):  []   1 ·   Portable Oxygen Use  []   1 ·   Orthostatic ? BP  []   1 ·   History of Recent Falls (within 3 mos.)  [x]   5     Ability to Rise from Chair (choose one) Pts. ·   Ability to rise in a single movement  [x]   0 ·   Pushes up, successful in one attempt  []   1 ·   Multiple attempts, but successful  []   3 ·   Unable to rise without assistance  []   4   Total: (5 or greater = High Risk) 5     Falls Prevention Plan:   []                Physical Limitations to Exercise (specify):   []                Mobility Assistance Device (type):   []                Exercise/Equipment Adaptation (specify):    ©2010 [facility] of [address] Patent #[id number].  Federal Law prohibits the replication, distribution or use without written permission from [facility] [email or web address]

## 2017-12-28 NOTE — PROGRESS NOTES
Tanisha Hilliard  : 1934  Primary: [facility]  Secondary:  [address]  at [facility] BRIA RANDOLPH  [address] [facility], [address] [place] [address],[address], [facility] [address]  [phone]   [phone]       OUTPATIENT PHYSICAL THERAPY:Initial Assessment 2017    ICD-10: Treatment Diagnosis: Other intraarticular fracture of lower end of right radius, sequela (S52.571S)  Precautions/Allergies:   Review of patient's allergies indicates no known allergies. Fall Risk Score: 5 (? 5 = High Risk)  MD Orders: ROM , gentle strengthening MEDICAL/REFERRING DIAGNOSIS:  S/P Orif RT Distal Radius    DATE OF ONSET: Dec 16th   REFERRING PHYSICIAN: Denise Garcia MD  RETURN PHYSICIAN APPOINTMENT: 17     INITIAL ASSESSMENT:  Ms. Randi Devi presents after ORIF R radius. She is out of her splint and is here for therapy to regain function of her dominant hand. PROBLEM LIST (Impacting functional limitations):  1. Decreased Strength  2. Decreased Activity Tolerance  3. Decreased Flexibility/Joint Mobility  4. Edema/Girth INTERVENTIONS PLANNED:  1. Home Exercise Program (HEP)  2. Manual Therapy  3. Therapeutic Exercise/Strengthening   TREATMENT PLAN:  Effective Dates: 17 TO 3/28/17. Frequency/Duration: 2-3 times a week for 3 weeks  GOALS: (Goals have been discussed and agreed upon with patient.)  Short-Term Functional Goals: Time Frame: 3 weeks  1. Independent with HEP for deficits. 2. Improved ROM of wrist to WNL to work toward regaining function. Discharge Goals: Time Frame: 8 weeks Expect new orders on subsequent MD visits. 1. Wrist and hand strength WNL to return to previous function. 2. Pt to report no pain with daily activities. 3. Improve Dash by 10 points of greater to indicate improved function. Rehabilitation Potential For Stated Goals: Good  Regarding Tanisha Hilliard's therapy, I certify that the treatment plan above will be carried out by a therapist or under their direction.   Thank you for this referral,  Emani Ozuna PT     Referring Physician Signature: Spenser Prasad MD              Date                    The information in this section was collected on 12/28/17 (except where otherwise noted). HISTORY:   History of Present Injury/Illness (Reason for Referral):  Pt fractured radius 3 weeks ago and had surgery 12/16/17 and was released from splint 12/26/17. She fractured her wrist from a fall as her  lost his balance and feel into her and then she fell. Pt has not had much pain and she is pleased. She reports she is using her hand some but not lifting anything. Past Medical History/Comorbidities:   Ms. Roxana Yo  has a past medical history of Arthritis; Hypertension; and Right arm pain. Ms. Roxana Yo  has a past surgical history that includes hx open cholecystectomy. Social History/Living Environment:     lives with  in home with stairs  Prior Level of Function/Work/Activity:  Independent with all activities. Currently pt is not driving. Dominant Side:         RIGHT  Current Medications:       Current Outpatient Prescriptions:     garlic cap, Take  by mouth. Indications: fruit and veggie vitamin with garlic- held 39/11/98, Disp: , Rfl:     acetaminophen (TYLENOL EXTRA STRENGTH) 500 mg tablet, Take 500 mg by mouth every six (6) hours as needed for Pain., Disp: , Rfl:     metoprolol succinate (TOPROL XL) 50 mg XL tablet, Take 50 mg by mouth daily. Indications: am- take on the dos, Disp: , Rfl:     UBIDECARENONE (CO Q-10 PO), Take 1 Tab by mouth daily. Indications: held 12/13/17, Disp: , Rfl:     aspirin delayed-release 81 mg tablet, Take 81 mg by mouth daily. Indications: am- take on the dos, Disp: , Rfl:     cholecalciferol, vitamin D3, (VITAMIN D3) 2,000 unit tab, Take 2,000 Units by mouth daily. Indications: held 12/13/17, Disp: , Rfl:     amLODIPine (NORVASC) 2.5 mg tablet, Take 2.5 mg by mouth daily.  Indications: am- take on the dos, Disp: , Rfl:    Date Last Reviewed: 12/28/2017   Number of Personal Factors/Comorbidities that affect the Plan of Care: 1-2: MODERATE COMPLEXITY   EXAMINATION:   ROM:  R wrist              Flex- 40 degrees              Ext-  20 degrees              Pronation- 60 degrees              Supination- 45 degrees            Strength:          Not tested today  Edema/Girth:   Girth around distal metacarpals   Left Right    Initial Most Recent Initial Most Recent   Upper  Extremity  19 cm R   18 cm L         Lower  Extremity            Minimal swelling in fingers , mod amount of swelling in hand   Body Structures Involved:  1. Bones  2. Joints  3. Muscles Body Functions Affected:  1. Neuromusculoskeletal Activities and Participation Affected:  1. Self Care   Number of elements (examined above) that affect the Plan of Care: 3: MODERATE COMPLEXITY   CLINICAL PRESENTATION:   Presentation: Evolving clinical presentation with changing clinical characteristics: MODERATE COMPLEXITY   CLINICAL DECISION MAKING:   Outcome Measure: Tool Used: Disabilities of the Arm, Shoulder and Hand (DASH) Questionnaire - Quick Version  Score:  Initial: 20/55  Most Recent: X/55 (Date: -- )   Interpretation of Score: The DASH is designed to measure the activities of daily living in person's with upper extremity dysfunction or pain. Each section is scored on a 1-5 scale, 5 representing the greatest disability. The scores of each section are added together for a total score of 55. Score 11 12-19 20-28 29-37 38-45 46-54 55   Modifier CH CI CJ CK CL CM CN     ? Carrying, Moving, and Handling Objects:     - CURRENT STATUS: CJ - 20%-39% impaired, limited or restricted    - GOAL STATUS: CI - 1%-19% impaired, limited or restricted    - D/C STATUS:  ---------------To be determined---------------      Medical Necessity:   · Patient is expected to demonstrate progress in strength and range of motion to return to function at home and at work. .  Reason for Services/Other Comments:  · Patient continues to require skilled intervention due to requiing therapy for guided rehab following fracture and surgery. .   Use of outcome tool(s) and clinical judgement create a POC that gives a: Questionable prediction of patient's progress: MODERATE COMPLEXITY            TREATMENT:   (In addition to Assessment/Re-Assessment sessions the following treatments were rendered)  Pre-treatment Symptoms/Complaints:  Pt states she is not having much pain. She does complain of tightness. Pain: Initial:     0/10 Post Session:  0/10     Manual therapy- retrograde massage to forearm and hand. Instructed pt in massage and elevating UE for decreasing edema  Therapeutic Exercise: ( ):15 min. Exercises per grid below to improve mobility. Required moderate verbal cues to promote proper body alignment, promote proper body posture and promote proper body mechanics. Progressed range as indicated. Date:  12/28 Date:   Date:     Activity/Exercise Parameters Parameters Parameters   Wrist ext/flex 10x     Supination/pronation 10x     Gripping and hand/finger ex 10x                             HEP- written ex for ex above  Treatment/Session Assessment:    · Response to Treatment:  Pt did well with treatment. Stated hand and wrist felt looser after treatment. · Compliance with Program/Exercises: Will assess as treatment progresses. · Recommendations/Intent for next treatment session: \"Next visit will focus on manual therapy for swelling , ROM and beginning strengthening for wrist.\".   Total Treatment Duration:       Melissa Barrios PT

## 2018-01-03 ENCOUNTER — HOSPITAL ENCOUNTER (OUTPATIENT)
Dept: PHYSICAL THERAPY | Age: 83
Discharge: HOME | End: 2018-01-03
Payer: COMMERCIAL

## 2018-01-03 PROCEDURE — 97140 MANUAL THERAPY 1/> REGIONS: CPT

## 2018-01-03 NOTE — PROGRESS NOTES
Tanisha Hilliard  : 1934  [facility]  Secondary:  [address]  at [facility]  1101 [facility], [address] [place] [address],[address], [facility] [address]  [phone]   [phone]       OUTPATIENT PHYSICAL THERAPY:Daily Note 1/3/2018    ICD-10: Treatment Diagnosis: Other intraarticular fracture of lower end of right radius, sequela (S52.571S)  Precautions/Allergies:   Review of patient's allergies indicates no known allergies. Fall Risk Score: 5 (? 5 = High Risk)  MD Orders: ROM , gentle strengthening MEDICAL/REFERRING DIAGNOSIS:  S/P Orif RT Distal Radius    DATE OF ONSET: Dec 16th   REFERRING PHYSICIAN: Priscilla Ivy MD  RETURN PHYSICIAN APPOINTMENT: 18     INITIAL ASSESSMENT:  Ms. Cyndie Allison presents after ORIF R radius. She is out of her splint and is here for therapy to regain function of her dominant hand. PROBLEM LIST (Impacting functional limitations):  1. Decreased Strength  2. Decreased Activity Tolerance  3. Decreased Flexibility/Joint Mobility  4. Edema/Girth INTERVENTIONS PLANNED:  1. Home Exercise Program (HEP)  2. Manual Therapy  3. Therapeutic Exercise/Strengthening   TREATMENT PLAN:  Effective Dates: 17 TO 3/28/17. Frequency/Duration: 2-3 times a week for 3 weeks  GOALS: (Goals have been discussed and agreed upon with patient.)  Short-Term Functional Goals: Time Frame: 3 weeks  1. Independent with HEP for deficits. 2. Improved ROM of wrist to WNL to work toward regaining function. Discharge Goals: Time Frame: 8 weeks Expect new orders on subsequent MD visits. 1. Wrist and hand strength WNL to return to previous function. 2. Pt to report no pain with daily activities. 3. Improve Dash by 10 points of greater to indicate improved function. Rehabilitation Potential For Stated Goals: Good  \            The information in this section was collected on 17 (except where otherwise noted).   HISTORY:   History of Present Injury/Illness (Reason for Referral):  Pt fractured radius 3 weeks ago and had surgery 12/16/17 and was released from splint 12/26/17. She fractured her wrist from a fall as her  lost his balance and feel into her and then she fell. Pt has not had much pain and she is pleased. She reports she is using her hand some but not lifting anything. Past Medical History/Comorbidities: from EMR:  Ms. Jami Dahl  has a past medical history of Arthritis; Hypertension; and Right arm pain. Ms. Jami Dahl  has a past surgical history that includes hx open cholecystectomy. Social History/Living Environment:     lives with  in home with stairs  Prior Level of Function/Work/Activity:  Independent with all activities. Currently pt is not driving. Dominant Side:         RIGHT  Current Medications:  From EMR:     Current Outpatient Prescriptions:     garlic cap, Take  by mouth. Indications: fruit and veggie vitamin with garlic- held 49/38/49, Disp: , Rfl:     acetaminophen (TYLENOL EXTRA STRENGTH) 500 mg tablet, Take 500 mg by mouth every six (6) hours as needed for Pain., Disp: , Rfl:     metoprolol succinate (TOPROL XL) 50 mg XL tablet, Take 50 mg by mouth daily. Indications: am- take on the dos, Disp: , Rfl:     UBIDECARENONE (CO Q-10 PO), Take 1 Tab by mouth daily. Indications: held 12/13/17, Disp: , Rfl:     aspirin delayed-release 81 mg tablet, Take 81 mg by mouth daily. Indications: am- take on the dos, Disp: , Rfl:     cholecalciferol, vitamin D3, (VITAMIN D3) 2,000 unit tab, Take 2,000 Units by mouth daily. Indications: held 12/13/17, Disp: , Rfl:     amLODIPine (NORVASC) 2.5 mg tablet, Take 2.5 mg by mouth daily.  Indications: am- take on the dos, Disp: , Rfl:    Date Last Reviewed:  1/3/18   EXAMINATION:   ROM:  R wrist              Flex- 40 degrees              Ext-  20 degrees              Pronation- 60 degrees              Supination- 45 degrees            Strength:          Not tested today  Edema/Girth:   Girth around distal metacarpals   Left Right    Initial Most Recent Initial Most Recent   Upper  Extremity  19 cm R   18 cm L         Lower  Extremity            Minimal swelling in fingers , mod amount of swelling in hand   Body Structures Involved:  1. Bones  2. Joints  3. Muscles Body Functions Affected:  1. Neuromusculoskeletal Activities and Participation Affected:  1. Self Care   CLINICAL DECISION MAKING:   Outcome Measure: Tool Used: Disabilities of the Arm, Shoulder and Hand (DASH) Questionnaire - Quick Version  Score:  Initial: 20/55  Most Recent: X/55 (Date: -- )   Interpretation of Score: The DASH is designed to measure the activities of daily living in person's with upper extremity dysfunction or pain. Each section is scored on a 1-5 scale, 5 representing the greatest disability. The scores of each section are added together for a total score of 55. Score 11 12-19 20-28 29-37 38-45 46-54 55   Modifier CH CI CJ CK CL CM CN     ? Carrying, Moving, and Handling Objects:     - CURRENT STATUS: CJ - 20%-39% impaired, limited or restricted    - GOAL STATUS: CI - 1%-19% impaired, limited or restricted    - D/C STATUS:  ---------------To be determined---------------      Medical Necessity:   · Patient is expected to demonstrate progress in strength and range of motion to return to function at home and at work. .  Reason for Services/Other Comments:  · Patient continues to require skilled intervention due to requiing therapy for guided rehab following fracture and surgery. .            TREATMENT:   (In addition to Assessment/Re-Assessment sessions the following treatments were rendered)  Pre-treatment Symptoms/Complaints:  Pt states she is not having much pain. HAs had very little pain, even right after surgery. Reports compliance with HEP.    Pain: Initial:   Pain Intensity 1: 0   Post Session:  None     Manual therapy (25 minutes) - for motion - grade 2 to 4- physio mobs R wrist flex and extn, radial and ulnar deviation, and briefly pronation and supination. Therapeutic Exercise: ( ): none today   Date:  12/28 Date:   Date:     Activity/Exercise Parameters Parameters Parameters   Wrist ext/flex 10x     Supination/pronation 10x     Gripping and hand/finger ex 10x                             HEP- continue current HEP - may hold soup can while doing exercises. Treatment/Session Assessment:    · Response to Treatment:  Pt did well  therapy. No reports of pain. · Compliance with Program/Exercises: yes  · Recommendations/Intent for next treatment session: \"Next visit will focus on manual therapy for swelling , ROM and beginning strengthening for wrist.\".   Total Treatment Duration: 25 minutes  PT Patient Time In/Time Out  Time In: 1118  Time Out: [address]

## 2018-01-05 ENCOUNTER — HOSPITAL ENCOUNTER (OUTPATIENT)
Dept: PHYSICAL THERAPY | Age: 83
Discharge: HOME | End: 2018-01-05
Payer: COMMERCIAL

## 2018-01-05 PROCEDURE — 97140 MANUAL THERAPY 1/> REGIONS: CPT

## 2018-01-05 PROCEDURE — 97035 APP MDLTY 1+ULTRASOUND EA 15: CPT

## 2018-01-05 NOTE — PROGRESS NOTES
Tanisha Hilliard  : 1934  [facility]  Secondary:  [address] at [facility] [place]  1101 [facility], [address] [place] [address] [address], [address] [facility]  [phone]   [phone]       OUTPATIENT PHYSICAL THERAPY:Daily Note 2018    ICD-10: Treatment Diagnosis: Other intraarticular fracture of lower end of right radius, sequela (S52.571S)  Precautions/Allergies:   Review of patient's allergies indicates no known allergies. Fall Risk Score: 5 (? 5 = High Risk)  MD Orders: ROM , gentle strengthening MEDICAL/REFERRING DIAGNOSIS:  S/P Orif RT Distal Radius    DATE OF ONSET: Dec 16th   REFERRING PHYSICIAN: Ke Spears MD  RETURN PHYSICIAN APPOINTMENT: 18     INITIAL ASSESSMENT:  Ms. Kathy Santizo presents after ORIF R radius. She is out of her splint and is here for therapy to regain function of her dominant hand. PROBLEM LIST (Impacting functional limitations):  1. Decreased Strength  2. Decreased Activity Tolerance  3. Decreased Flexibility/Joint Mobility  4. Edema/Girth INTERVENTIONS PLANNED:  1. Home Exercise Program (HEP)  2. Manual Therapy  3. Therapeutic Exercise/Strengthening   TREATMENT PLAN:  Effective Dates: 17 TO 3/28/17. Frequency/Duration: 2-3 times a week for 3 weeks  GOALS: (Goals have been discussed and agreed upon with patient.)  Short-Term Functional Goals: Time Frame: 3 weeks  1. Independent with HEP for deficits. 2. Improved ROM of wrist to WNL to work toward regaining function. Discharge Goals: Time Frame: 8 weeks Expect new orders on subsequent MD visits. 1. Wrist and hand strength WNL to return to previous function. 2. Pt to report no pain with daily activities. 3. Improve Dash by 10 points of greater to indicate improved function. Rehabilitation Potential For Stated Goals: Good  \            The information in this section was collected on 17 (except where otherwise noted).   HISTORY:   History of Present Injury/Illness (Reason for Referral):  Pt fractured radius 3 weeks ago and had surgery 12/16/17 and was released from splint 12/26/17. She fractured her wrist from a fall as her  lost his balance and feel into her and then she fell. Pt has not had much pain and she is pleased. She reports she is using her hand some but not lifting anything. Past Medical History/Comorbidities: from EMR:  Ms. Renata Guzman  has a past medical history of Arthritis; Hypertension; and Right arm pain. Ms. Renata Guzman  has a past surgical history that includes hx open cholecystectomy. Social History/Living Environment:     lives with  in home with stairs  Prior Level of Function/Work/Activity:  Independent with all activities. Currently pt is not driving. Dominant Side:         RIGHT  Current Medications:  From EMR:     Current Outpatient Prescriptions:     garlic cap, Take  by mouth. Indications: fruit and veggie vitamin with garlic- held 74/78/61, Disp: , Rfl:     acetaminophen (TYLENOL EXTRA STRENGTH) 500 mg tablet, Take 500 mg by mouth every six (6) hours as needed for Pain., Disp: , Rfl:     metoprolol succinate (TOPROL XL) 50 mg XL tablet, Take 50 mg by mouth daily. Indications: am- take on the dos, Disp: , Rfl:     UBIDECARENONE (CO Q-10 PO), Take 1 Tab by mouth daily. Indications: held 12/13/17, Disp: , Rfl:     aspirin delayed-release 81 mg tablet, Take 81 mg by mouth daily. Indications: am- take on the dos, Disp: , Rfl:     cholecalciferol, vitamin D3, (VITAMIN D3) 2,000 unit tab, Take 2,000 Units by mouth daily. Indications: held 12/13/17, Disp: , Rfl:     amLODIPine (NORVASC) 2.5 mg tablet, Take 2.5 mg by mouth daily. Indications: am- take on the dos, Disp: , Rfl:    Date Last Reviewed:  1/3/18   EXAMINATION:   Observation: mild to moderate swelling of the right hand and wrist. Significant compensatory movements at the shoulder and elbow during demonstration of wrist exercises.  Incision scar healed  Palpation: adhesions along the incision scar  ROM: pain free                 RUE AROM  R Forearm Pronation: 90  R Forearm Supination: 50  R Wrist Flexion: 51  R Wrist Extension: 32                   ROM:   At evaluation:   R wrist              Flex- 40 degrees              Ext-  20 degrees              Pronation- 60 degrees              Supination- 45 degrees            Strength:          Not tested today  Edema/Girth:   (Girth around distal metacarpals): n/t   Left Right    Initial Most Recent Initial Most Recent   Upper  Extremity  19 cm R   18 cm L         Lower  Extremity            Minimal swelling in fingers , mod amount of swelling in hand   Body Structures Involved:  1. Bones  2. Joints  3. Muscles Body Functions Affected:  1. Neuromusculoskeletal Activities and Participation Affected:  1. Self Care   CLINICAL DECISION MAKING:   Outcome Measure: Tool Used: Disabilities of the Arm, Shoulder and Hand (DASH) Questionnaire - Quick Version  Score:  Initial: 20/55  Most Recent: X/55 (Date: -- )   Interpretation of Score: The DASH is designed to measure the activities of daily living in person's with upper extremity dysfunction or pain. Each section is scored on a 1-5 scale, 5 representing the greatest disability. The scores of each section are added together for a total score of 55. Score 11 12-19 20-28 29-37 38-45 46-54 55   Modifier CH CI CJ CK CL CM CN     ? Carrying, Moving, and Handling Objects:     - CURRENT STATUS: CJ - 20%-39% impaired, limited or restricted    - GOAL STATUS: CI - 1%-19% impaired, limited or restricted    - D/C STATUS:  ---------------To be determined---------------      Medical Necessity:   · Patient is expected to demonstrate progress in strength and range of motion to return to function at home and at work. .  Reason for Services/Other Comments:  · Patient continues to require skilled intervention due to requiing therapy for guided rehab following fracture and surgery. .            TREATMENT:   (In addition to Assessment/Re-Assessment sessions the following treatments were rendered)  Pre-treatment Symptoms/Complaints:  Pt states she is not having much pain but that the incision scar itches a lot. Doing the HEP with a soup can. Pain: Initial:      2/10 Post Session:  0-1 after     Manual therapy (30 minutes) - for motion - grade 2 to 4- physio mobs R wrist flex and extn and forearm supination. Therapeutic Exercise: (4 Minutes): reviewed HEP technique for supported wrist flexion/extn along with supported supination/pronation. Tactile and verbal cueing to decrease overuse of the elbow and shoulder   Date:  12/28 Date:   Date:     Activity/Exercise Parameters Parameters Parameters   Wrist ext/flex 10x     Supination/pronation 10x     Gripping and hand/finger ex 10x                             HEP- continue current HEP as reviewed with 1#/soup can. Modalities: ( 9 minutes): continuous ultrasound prior to manual treatment to the right incision scar at 1.0 W/cm2 for mechanical effects on tissue and allow improved ROM  Treatment/Session Assessment:    · Response to Treatment:  Better demonstration of exercises after review. Improved motion after treatment  · Compliance with Program/Exercises: yes  · Recommendations/Intent for next treatment session: \"Next visit will focus on manual therapy for swelling , ROM and beginning strengthening for wrist.\".   Total Treatment Duration: 43    minutes  PT Patient Time In/Time Out  Time In: 1115  Time Out: [address], PT

## 2018-01-09 ENCOUNTER — HOSPITAL ENCOUNTER (OUTPATIENT)
Dept: PHYSICAL THERAPY | Age: 83
Discharge: HOME | End: 2018-01-09
Payer: COMMERCIAL

## 2018-01-09 PROCEDURE — 97035 APP MDLTY 1+ULTRASOUND EA 15: CPT

## 2018-01-09 PROCEDURE — 97140 MANUAL THERAPY 1/> REGIONS: CPT

## 2018-01-10 NOTE — PROGRESS NOTES
Tanisha Hilliard  : 1934  [facility]  Secondary:  [address]  at [facility] [place]  1101 [facility], [address] [place] [address],[address], [facility] [address]  [phone]   [phone]       OUTPATIENT PHYSICAL THERAPY:Daily Note 2018    ICD-10: Treatment Diagnosis: Other intraarticular fracture of lower end of right radius, sequela (S52.571S)  Precautions/Allergies:   Review of patient's allergies indicates no known allergies. Fall Risk Score: 5 (? 5 = High Risk)  MD Orders: ROM , gentle strengthening MEDICAL/REFERRING DIAGNOSIS:  S/P Orif RT Distal Radius    DATE OF ONSET: Dec 16th   REFERRING PHYSICIAN: Deana Donovan MD  RETURN PHYSICIAN APPOINTMENT: 18     INITIAL ASSESSMENT:  Ms. Daniel James presents after ORIF R radius. She is out of her splint and is here for therapy to regain function of her dominant hand. PROBLEM LIST (Impacting functional limitations):  1. Decreased Strength  2. Decreased Activity Tolerance  3. Decreased Flexibility/Joint Mobility  4. Edema/Girth INTERVENTIONS PLANNED:  1. Home Exercise Program (HEP)  2. Manual Therapy  3. Therapeutic Exercise/Strengthening   TREATMENT PLAN:  Effective Dates: 17 TO 3/28/17. Frequency/Duration: 2-3 times a week for 3 weeks  GOALS: (Goals have been discussed and agreed upon with patient.)  Short-Term Functional Goals: Time Frame: 3 weeks  1. Independent with HEP for deficits. 2. Improved ROM of wrist to WNL to work toward regaining function. Discharge Goals: Time Frame: 8 weeks Expect new orders on subsequent MD visits. 1. Wrist and hand strength WNL to return to previous function. 2. Pt to report no pain with daily activities. 3. Improve Dash by 10 points of greater to indicate improved function. Rehabilitation Potential For Stated Goals: Good  \            The information in this section was collected on 17 (except where otherwise noted).   HISTORY:   History of Present Injury/Illness (Reason for Referral):  Pt fractured radius 3 weeks ago and had surgery 12/16/17 and was released from splint 12/26/17. She fractured her wrist from a fall as her  lost his balance and feel into her and then she fell. Pt has not had much pain and she is pleased. She reports she is using her hand some but not lifting anything. Past Medical History/Comorbidities: from EMR:  Ms. Daniel James  has a past medical history of Arthritis; Hypertension; and Right arm pain. Ms. Daniel James  has a past surgical history that includes hx open cholecystectomy. Social History/Living Environment:     lives with  in home with stairs  Prior Level of Function/Work/Activity:  Independent with all activities. Currently pt is not driving. Dominant Side:         RIGHT  Current Medications:  From EMR:     Current Outpatient Prescriptions:     garlic cap, Take  by mouth. Indications: fruit and veggie vitamin with garlic- held 61/52/76, Disp: , Rfl:     acetaminophen (TYLENOL EXTRA STRENGTH) 500 mg tablet, Take 500 mg by mouth every six (6) hours as needed for Pain., Disp: , Rfl:     metoprolol succinate (TOPROL XL) 50 mg XL tablet, Take 50 mg by mouth daily. Indications: am- take on the dos, Disp: , Rfl:     UBIDECARENONE (CO Q-10 PO), Take 1 Tab by mouth daily. Indications: held 12/13/17, Disp: , Rfl:     aspirin delayed-release 81 mg tablet, Take 81 mg by mouth daily. Indications: am- take on the dos, Disp: , Rfl:     cholecalciferol, vitamin D3, (VITAMIN D3) 2,000 unit tab, Take 2,000 Units by mouth daily. Indications: held 12/13/17, Disp: , Rfl:     amLODIPine (NORVASC) 2.5 mg tablet, Take 2.5 mg by mouth daily. Indications: am- take on the dos, Disp: , Rfl:    Date Last Reviewed:  1/3/18   EXAMINATION:   Observation: mild to moderate swelling of the right hand and wrist. Significant compensatory movements at the shoulder and elbow during demonstration of wrist exercises.  Incision scar healed  Palpation: adhesions along the incision scar  ROM: pain free                                     ROM:   At evaluation:   R wrist              Flex- 40 degrees              Ext-  20 degrees              Pronation- 60 degrees              Supination- 45 degrees            Strength:          Not tested today  Edema/Girth:   (Girth around distal metacarpals): n/t   Left Right    Initial Most Recent Initial Most Recent   Upper  Extremity  19 cm R   18 cm L         Lower  Extremity            Minimal swelling in fingers , mod amount of swelling in hand   Body Structures Involved:  1. Bones  2. Joints  3. Muscles Body Functions Affected:  1. Neuromusculoskeletal Activities and Participation Affected:  1. Self Care   CLINICAL DECISION MAKING:   Outcome Measure: Tool Used: Disabilities of the Arm, Shoulder and Hand (DASH) Questionnaire - Quick Version  Score:  Initial: 20/55  Most Recent: X/55 (Date: -- )   Interpretation of Score: The DASH is designed to measure the activities of daily living in person's with upper extremity dysfunction or pain. Each section is scored on a 1-5 scale, 5 representing the greatest disability. The scores of each section are added together for a total score of 55. Score 11 12-19 20-28 29-37 38-45 46-54 55   Modifier CH CI CJ CK CL CM CN     ? Carrying, Moving, and Handling Objects:     - CURRENT STATUS: CJ - 20%-39% impaired, limited or restricted    - GOAL STATUS: CI - 1%-19% impaired, limited or restricted    - D/C STATUS:  ---------------To be determined---------------      Medical Necessity:   · Patient is expected to demonstrate progress in strength and range of motion to return to function at home and at work. .  Reason for Services/Other Comments:  · Patient continues to require skilled intervention due to requiing therapy for guided rehab following fracture and surgery. .            TREATMENT:   (In addition to Assessment/Re-Assessment sessions the following treatments were rendered)  Pre-treatment Symptoms/Complaints:  Pt states she is moving better and having less pain  Pain: Initial:      0/10 Post Session:  0-1 after     Manual therapy (18 minutes) - for motion - grade 2 to 4- physio mobs R wrist flex and extn and forearm supination. Therapeutic Exercise: ( ): 5 min with review of HEP. Date:  12/28 Date:  1/9 Date:     Activity/Exercise Parameters Parameters Parameters   Wrist ext/flex 10x 10x    Supination/pronation 10x 10x    Gripping and hand/finger ex 10x 10x                            HEP- continue current HEP as reviewed with 1#/soup can. Modalities: ( 8 minutes): continuous ultrasound prior to manual treatment to the right incision scar at 1.0 W/cm2 for mechanical effects on tissue and allow improved ROM  Treatment/Session Assessment:    · Response to Treatment:  Motion improved after session. · Compliance with Program/Exercises: yes  · Recommendations/Intent for next treatment session: \"Next visit will focus on manual therapy for swelling , ROM and beginning strengthening for wrist.\".   Total Treatment Duration: 34  minutes       Kvng Sorto, PT

## 2018-01-11 ENCOUNTER — HOSPITAL ENCOUNTER (OUTPATIENT)
Dept: PHYSICAL THERAPY | Age: 83
Discharge: HOME | End: 2018-01-11
Payer: COMMERCIAL

## 2018-01-11 PROCEDURE — 97140 MANUAL THERAPY 1/> REGIONS: CPT

## 2018-01-11 PROCEDURE — 97035 APP MDLTY 1+ULTRASOUND EA 15: CPT

## 2018-01-11 NOTE — PROGRESS NOTES
Tanisha Hilliard  : 1934  Primary: Kaleb Cr [facility]  Secondary:  [address]  at [facility] BRIA RANDOLPH  [address] [facility], [facility] [address]  [phone]   [phone]       OUTPATIENT PHYSICAL THERAPY:Daily Note 2018    ICD-10: Treatment Diagnosis: Other intraarticular fracture of lower end of right radius, sequela (S52.571S)  Precautions/Allergies:   Review of patient's allergies indicates no known allergies. Fall Risk Score: 5 (? 5 = High Risk)  MD Orders: ROM , gentle strengthening MEDICAL/REFERRING DIAGNOSIS:  S/P Orif RT Distal Radius    DATE OF ONSET: Dec 16th   REFERRING PHYSICIAN: Megan Andres MD  RETURN PHYSICIAN APPOINTMENT: 18     INITIAL ASSESSMENT:  Ms. Sade Jones presents after ORIF R radius. She is out of her splint and is here for therapy to regain function of her dominant hand. PROBLEM LIST (Impacting functional limitations):  1. Decreased Strength  2. Decreased Activity Tolerance  3. Decreased Flexibility/Joint Mobility  4. Edema/Girth INTERVENTIONS PLANNED:  1. Home Exercise Program (HEP)  2. Manual Therapy  3. Therapeutic Exercise/Strengthening   TREATMENT PLAN:  Effective Dates: 17 TO 3/28/17. Frequency/Duration: 2-3 times a week for 3 weeks  GOALS: (Goals have been discussed and agreed upon with patient.)  Short-Term Functional Goals: Time Frame: 3 weeks  1. Independent with HEP for deficits. 2. Improved ROM of wrist to WNL to work toward regaining function. Discharge Goals: Time Frame: 8 weeks Expect new orders on subsequent MD visits. 1. Wrist and hand strength WNL to return to previous function. 2. Pt to report no pain with daily activities. 3. Improve Dash by 10 points of greater to indicate improved function. Rehabilitation Potential For Stated Goals: Good  \            The information in this section was collected on 17 (except where otherwise noted).   HISTORY:   History of Present Injury/Illness (Reason for Referral):  Pt fractured radius 3 weeks ago and had surgery 12/16/17 and was released from splint 12/26/17. She fractured her wrist from a fall as her  lost his balance and feel into her and then she fell. Pt has not had much pain and she is pleased. She reports she is using her hand some but not lifting anything. Past Medical History/Comorbidities: from EMR:  Ms. Castro Cotto  has a past medical history of Arthritis; Hypertension; and Right arm pain. Ms. Castro Cotto  has a past surgical history that includes hx open cholecystectomy. Social History/Living Environment:     lives with  in home with stairs  Prior Level of Function/Work/Activity:  Independent with all activities. Currently pt is not driving. Dominant Side:         RIGHT  Current Medications:  From EMR:     Current Outpatient Prescriptions:     garlic cap, Take  by mouth. Indications: fruit and veggie vitamin with garlic- held 10/95/88, Disp: , Rfl:     acetaminophen (TYLENOL EXTRA STRENGTH) 500 mg tablet, Take 500 mg by mouth every six (6) hours as needed for Pain., Disp: , Rfl:     metoprolol succinate (TOPROL XL) 50 mg XL tablet, Take 50 mg by mouth daily. Indications: am- take on the dos, Disp: , Rfl:     UBIDECARENONE (CO Q-10 PO), Take 1 Tab by mouth daily. Indications: held 12/13/17, Disp: , Rfl:     aspirin delayed-release 81 mg tablet, Take 81 mg by mouth daily. Indications: am- take on the dos, Disp: , Rfl:     cholecalciferol, vitamin D3, (VITAMIN D3) 2,000 unit tab, Take 2,000 Units by mouth daily. Indications: held 12/13/17, Disp: , Rfl:     amLODIPine (NORVASC) 2.5 mg tablet, Take 2.5 mg by mouth daily. Indications: am- take on the dos, Disp: , Rfl:    Date Last Reviewed:  1/3/18   EXAMINATION:   Observation: mild to moderate swelling of the right hand and wrist. Significant compensatory movements at the shoulder and elbow during demonstration of wrist exercises.  Incision scar healed  Palpation: adhesions along the incision scar  ROM: pain free                                     ROM:   At evaluation:   R wrist              Flex- 40 degrees              Ext-  20 degrees              Pronation- 60 degrees              Supination- 45 degrees            Strength:          Not tested today  Edema/Girth:   (Girth around distal metacarpals): n/t   Left Right    Initial Most Recent Initial Most Recent   Upper  Extremity  19 cm R   18 cm L         Lower  Extremity            Minimal swelling in fingers , mod amount of swelling in hand   Body Structures Involved:  1. Bones  2. Joints  3. Muscles Body Functions Affected:  1. Neuromusculoskeletal Activities and Participation Affected:  1. Self Care   CLINICAL DECISION MAKING:   Outcome Measure: Tool Used: Disabilities of the Arm, Shoulder and Hand (DASH) Questionnaire - Quick Version  Score:  Initial: 20/55  Most Recent: X/55 (Date: -- )   Interpretation of Score: The DASH is designed to measure the activities of daily living in person's with upper extremity dysfunction or pain. Each section is scored on a 1-5 scale, 5 representing the greatest disability. The scores of each section are added together for a total score of 55. Score 11 12-19 20-28 29-37 38-45 46-54 55   Modifier CH CI CJ CK CL CM CN     ? Carrying, Moving, and Handling Objects:     - CURRENT STATUS: CJ - 20%-39% impaired, limited or restricted    - GOAL STATUS: CI - 1%-19% impaired, limited or restricted    - D/C STATUS:  ---------------To be determined---------------      Medical Necessity:   · Patient is expected to demonstrate progress in strength and range of motion to return to function at home and at work. .  Reason for Services/Other Comments:  · Patient continues to require skilled intervention due to requiing therapy for guided rehab following fracture and surgery. .            TREATMENT:   (In addition to Assessment/Re-Assessment sessions the following treatments were rendered)  Pre-treatment Symptoms/Complaints: No pain. 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 Pain: Initial:      0/10 Post Session:  0-1 after     Manual therapy (18 minutes) - for motion - grade 2 to 4- physio mobs R wrist flex and extn and forearm supination. Therapeutic Exercise: ( ): 5 min with review of HEP. Date:  12/28 Date:  1/9 Date:  1/11   Activity/Exercise Parameters Parameters Parameters   Wrist ext/flex 10x 10x 10   Supination/pronation 10x 10x 10   Gripping and hand/finger ex 10x 10x 10                           HEP- continue current HEP as reviewed with 1#/soup can. Also prayer stretch and wrist ext with hand on table top  Modalities: ( 8 minutes): continuous ultrasound prior to manual treatment to the right incision scar at 1.0 W/cm2 for mechanical effects on tissue and allow improved ROM  Treatment/Session Assessment:    · Response to Treatment:  Motion improved after session. · Compliance with Program/Exercises: yes  · Recommendations/Intent for next treatment session: \"Next visit will focus on manual therapy for swelling , ROM and beginning strengthening for wrist.\".   Total Treatment Duration: 31  minutes  PT Patient Time In/Time Out  Time In: 0115  Time Out: 0145    Kris Luke, PT

## 2018-01-16 ENCOUNTER — HOSPITAL ENCOUNTER (OUTPATIENT)
Dept: PHYSICAL THERAPY | Age: 83
Discharge: HOME | End: 2018-01-16
Payer: COMMERCIAL

## 2018-01-16 PROCEDURE — 97035 APP MDLTY 1+ULTRASOUND EA 15: CPT

## 2018-01-16 PROCEDURE — 97140 MANUAL THERAPY 1/> REGIONS: CPT

## 2018-01-18 ENCOUNTER — HOSPITAL ENCOUNTER (OUTPATIENT)
Dept: PHYSICAL THERAPY | Age: 83
Discharge: HOME | End: 2018-01-18
Payer: COMMERCIAL

## 2018-01-19 ENCOUNTER — HOSPITAL ENCOUNTER (OUTPATIENT)
Dept: PHYSICAL THERAPY | Age: 83
Discharge: HOME | End: 2018-01-19
Payer: COMMERCIAL

## 2018-01-19 NOTE — PROGRESS NOTES
Tanisha Hilliard  : 1934  Primary: Kaleb Cr [facility]  Secondary:  [address] at [facility], [facility] [place]  [address] [place] [address],  Андрей Iglesias.  [phone]   [phone]       OUTPATIENT PHYSICAL Avel Parham  65. 2018    ICD-10: Treatment Diagnosis: Other intraarticular fracture of lower end of right radius, sequela (S52.571S)  Precautions/Allergies:   Review of patient's allergies indicates no known allergies.    Fall Risk Score: 5 (? 5 = High Risk)  MD Orders: ROM , gentle strengthening MEDICAL/REFERRING DIAGNOSIS:  S/P Orif RT Distal Radius    DATE OF ONSET: Dec 16th   REFERRING PHYSICIAN: Placido Jackson MD  RETURN PHYSICIAN APPOINTMENT: 18   Patient called to cancel scheduled appointment due to weather

## 2018-01-23 ENCOUNTER — HOSPITAL ENCOUNTER (OUTPATIENT)
Dept: PHYSICAL THERAPY | Age: 83
Discharge: HOME | End: 2018-01-23
Payer: COMMERCIAL

## 2018-01-23 PROCEDURE — 97110 THERAPEUTIC EXERCISES: CPT

## 2018-01-23 PROCEDURE — 97140 MANUAL THERAPY 1/> REGIONS: CPT

## 2018-01-23 NOTE — PROGRESS NOTES
Tanisha Hilliard  : 1934  [facility]: [facility]  Secondary:  [address] at [facility] BRIA RANDOLHP  [address] [facility], [address] [place] [address],[address], [facility] [address]  [phone]   [phone]       OUTPATIENT PHYSICAL THERAPY:Daily Note 2018    ICD-10: Treatment Diagnosis: Other intraarticular fracture of lower end of right radius, sequela (S52.571S)  Precautions/Allergies:   Review of patient's allergies indicates no known allergies. Fall Risk Score: 5 (? 5 = High Risk)  MD Orders: Continue PT  2-3x/wk for 4 weeks (written 18) MEDICAL/REFERRING DIAGNOSIS:  S/P Orif RT Distal Radius    DATE OF ONSET: Dec 16th   REFERRING PHYSICIAN: Waldo Coffman MD  RETURN PHYSICIAN APPOINTMENT: 18     INITIAL ASSESSMENT:  Ms. Marcin Grande presents after ORIF R radius. She is out of her splint and is here for therapy to regain function of her dominant hand. PROBLEM LIST (Impacting functional limitations):  1. Decreased Strength  2. Decreased Activity Tolerance  3. Decreased Flexibility/Joint Mobility  4. Edema/Girth INTERVENTIONS PLANNED:  1. Home Exercise Program (HEP)  2. Manual Therapy  3. Therapeutic Exercise/Strengthening   TREATMENT PLAN:  Effective Dates: 17 TO 3/28/17. Frequency/Duration: 2-3 times a week for 3 weeks  GOALS: (Goals have been discussed and agreed upon with patient.)  Short-Term Functional Goals: Time Frame: 3 weeks  1. Independent with HEP for deficits. 2. Improved ROM of wrist to WNL to work toward regaining function. Discharge Goals: Time Frame: 8 weeks Expect new orders on subsequent MD visits. 1. Wrist and hand strength WNL to return to previous function. 2. Pt to report no pain with daily activities. 3. Improve Dash by 10 points of greater to indicate improved function. Rehabilitation Potential For Stated Goals: Good  \            The information in this section was collected on 17 (except where otherwise noted).   HISTORY:   History of Present Injury/Illness (Reason for Referral):  Pt fractured radius 3 weeks ago and had surgery 12/16/17 and was released from splint 12/26/17. She fractured her wrist from a fall as her  lost his balance and feel into her and then she fell. Pt has not had much pain and she is pleased. She reports she is using her hand some but not lifting anything. Past Medical History/Comorbidities: from EMR:  Ms. Shira Duffy  has a past medical history of Arthritis; Hypertension; and Right arm pain. Ms. Shira Duffy  has a past surgical history that includes hx open cholecystectomy. Social History/Living Environment:     lives with  in home with stairs  Prior Level of Function/Work/Activity:  Independent with all activities. Currently pt is not driving. Dominant Side:         RIGHT  Current Medications:  From EMR:     Current Outpatient Prescriptions:     garlic cap, Take  by mouth. Indications: fruit and veggie vitamin with garlic- held 93/86/26, Disp: , Rfl:     acetaminophen (TYLENOL EXTRA STRENGTH) 500 mg tablet, Take 500 mg by mouth every six (6) hours as needed for Pain., Disp: , Rfl:     metoprolol succinate (TOPROL XL) 50 mg XL tablet, Take 50 mg by mouth daily. Indications: am- take on the dos, Disp: , Rfl:     UBIDECARENONE (CO Q-10 PO), Take 1 Tab by mouth daily. Indications: held 12/13/17, Disp: , Rfl:     aspirin delayed-release 81 mg tablet, Take 81 mg by mouth daily. Indications: am- take on the dos, Disp: , Rfl:     cholecalciferol, vitamin D3, (VITAMIN D3) 2,000 unit tab, Take 2,000 Units by mouth daily. Indications: held 12/13/17, Disp: , Rfl:     amLODIPine (NORVASC) 2.5 mg tablet, Take 2.5 mg by mouth daily. Indications: am- take on the dos, Disp: , Rfl:    Date Last Reviewed:  1/3/18   EXAMINATION:   Observation: mild to moderate swelling of the right hand and wrist. Significant compensatory movements at the shoulder and elbow during demonstration of wrist exercises.  Incision scar healed  Palpation: adhesions along the incision scar  ROM: pain free                                     ROM:   At evaluation:   R wrist              Flex- 40 degrees              Ext-  20 degrees              Pronation- 60 degrees              Supination- 45 degrees            Strength:          Not tested today  Edema/Girth:   (Girth around distal metacarpals): n/t   Left Right    Initial Most Recent Initial Most Recent   Upper  Extremity  19 cm R   18 cm L         Lower  Extremity            Minimal swelling in fingers , mod amount of swelling in hand   Body Structures Involved:  1. Bones  2. Joints  3. Muscles Body Functions Affected:  1. Neuromusculoskeletal Activities and Participation Affected:  1. Self Care   CLINICAL DECISION MAKING:   Outcome Measure: Tool Used: Disabilities of the Arm, Shoulder and Hand (DASH) Questionnaire - Quick Version  Score:  Initial: 20/55  Most Recent: X/55 (Date: -- )   Interpretation of Score: The DASH is designed to measure the activities of daily living in person's with upper extremity dysfunction or pain. Each section is scored on a 1-5 scale, 5 representing the greatest disability. The scores of each section are added together for a total score of 55. Score 11 12-19 20-28 29-37 38-45 46-54 55   Modifier CH CI CJ CK CL CM CN     ? Carrying, Moving, and Handling Objects:     - CURRENT STATUS: CJ - 20%-39% impaired, limited or restricted    - GOAL STATUS: CI - 1%-19% impaired, limited or restricted    - D/C STATUS:  ---------------To be determined---------------      Medical Necessity:   · Patient is expected to demonstrate progress in strength and range of motion to return to function at home and at work. .  Reason for Services/Other Comments:  · Patient continues to require skilled intervention due to requiing therapy for guided rehab following fracture and surgery. .            TREATMENT:   (In addition to Assessment/Re-Assessment sessions the following treatments were rendered)  Pre-treatment Symptoms/Complaints: No pain. 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 Pain: Initial:   Pain Intensity 1: 0    Post Session:  No pain at end of session. Manual therapy (15 minutes) - for motion - grade 2 to 4- physio mobs R wrist flex and extn, radial and ulnar deviation   Therapeutic Exercise: (10 Minutes):  Exercises per grid below to improve mobility and strength. Required moderate visual and verbal cues to ensure coorect performance. Progressed complexity of movement as indicated. Date:  12/28 Date:  1/9 Date:  1/11 Date  1/16 Date  1/23/18   Activity/Exercise Parameters Parameters Parameters     Wrist ext/flex 10x 10x 10 With red t band x10 -   Supination/pronation 10x 10x 10 Red t band x 10 -   Gripping and hand/finger ex 10x 10x 10  -   Wrist flex     1# 2x15   Wrist extn      1# 2x15   Wrist radial deviation     1# 2x15   Pronation/supination     1# 2x15   gripper     Red 1 x10           HEP- continue current HEP  Modalities: ( 0 minutes): none today  Treatment/Session Assessment:    · Response to Treatment:  Patient reported that wrist felt good at end of session. No increase in pain. May be ready to increase weight next session. · Compliance with Program/Exercises: yes  · Recommendations/Intent for next treatment session: \"Next visit will focus on manual therapy for swelling , ROM and beginning strengthening for wrist.\".   Total Treatment Duration: 25  minutes  PT Patient Time In/Time Out  Time In: 1118  Time Out: 209 [facility]

## 2018-01-25 ENCOUNTER — HOSPITAL ENCOUNTER (OUTPATIENT)
Dept: PHYSICAL THERAPY | Age: 83
Discharge: HOME | End: 2018-01-25
Payer: COMMERCIAL

## 2018-01-25 PROCEDURE — 97140 MANUAL THERAPY 1/> REGIONS: CPT

## 2018-01-25 PROCEDURE — 97110 THERAPEUTIC EXERCISES: CPT

## 2018-01-25 NOTE — PROGRESS NOTES
Tanisha Hilliard  : 1934  [facility]  Secondary:  [address]  at [facility] [place] RAND[place]  1101 [facility], [address] [place] [address],[address], [address] [facility]  [phone]   [phone]       OUTPATIENT PHYSICAL THERAPY:Daily Note 2018    ICD-10: Treatment Diagnosis: Other intraarticular fracture of lower end of right radius, sequela (S52.571S)  Precautions/Allergies:   Review of patient's allergies indicates no known allergies. Fall Risk Score: 5 (? 5 = High Risk)  MD Orders: Continue PT  2-3x/wk for 4 weeks (written 18) MEDICAL/REFERRING DIAGNOSIS:  S/P Orif RT Distal Radius    DATE OF ONSET: Dec 16th   REFERRING PHYSICIAN: Lani Franz MD  RETURN PHYSICIAN APPOINTMENT: 18     INITIAL ASSESSMENT:  Ms. Roque Obregon presents after ORIF R radius. She is out of her splint and is here for therapy to regain function of her dominant hand. PROBLEM LIST (Impacting functional limitations):  1. Decreased Strength  2. Decreased Activity Tolerance  3. Decreased Flexibility/Joint Mobility  4. Edema/Girth INTERVENTIONS PLANNED:  1. Home Exercise Program (HEP)  2. Manual Therapy  3. Therapeutic Exercise/Strengthening   TREATMENT PLAN:  Effective Dates: 17 TO 3/28/17. Frequency/Duration: 2-3 times a week for 3 weeks  GOALS: (Goals have been discussed and agreed upon with patient.)  Short-Term Functional Goals: Time Frame: 3 weeks  1. Independent with HEP for deficits. 2. Improved ROM of wrist to WNL to work toward regaining function. Discharge Goals: Time Frame: 8 weeks Expect new orders on subsequent MD visits. 1. Wrist and hand strength WNL to return to previous function. 2. Pt to report no pain with daily activities. 3. Improve Dash by 10 points of greater to indicate improved function. Rehabilitation Potential For Stated Goals: Good  \            The information in this section was collected on 17 (except where otherwise noted).   HISTORY:   History of Present Injury/Illness (Reason for Referral):  Pt fractured radius 3 weeks ago and had surgery 12/16/17 and was released from splint 12/26/17. She fractured her wrist from a fall as her  lost his balance and feel into her and then she fell. Pt has not had much pain and she is pleased. She reports she is using her hand some but not lifting anything. Past Medical History/Comorbidities: from EMR:  Ms. Nazia Schreiber  has a past medical history of Arthritis; Hypertension; and Right arm pain. Ms. Nazia Schreiber  has a past surgical history that includes hx open cholecystectomy. Social History/Living Environment:     lives with  in home with stairs  Prior Level of Function/Work/Activity:  Independent with all activities. Currently pt is not driving. Dominant Side:         RIGHT  Current Medications:  From EMR:     Current Outpatient Prescriptions:     garlic cap, Take  by mouth. Indications: fruit and veggie vitamin with garlic- held 91/36/03, Disp: , Rfl:     acetaminophen (TYLENOL EXTRA STRENGTH) 500 mg tablet, Take 500 mg by mouth every six (6) hours as needed for Pain., Disp: , Rfl:     metoprolol succinate (TOPROL XL) 50 mg XL tablet, Take 50 mg by mouth daily. Indications: am- take on the dos, Disp: , Rfl:     UBIDECARENONE (CO Q-10 PO), Take 1 Tab by mouth daily. Indications: held 12/13/17, Disp: , Rfl:     aspirin delayed-release 81 mg tablet, Take 81 mg by mouth daily. Indications: am- take on the dos, Disp: , Rfl:     cholecalciferol, vitamin D3, (VITAMIN D3) 2,000 unit tab, Take 2,000 Units by mouth daily. Indications: held 12/13/17, Disp: , Rfl:     amLODIPine (NORVASC) 2.5 mg tablet, Take 2.5 mg by mouth daily. Indications: am- take on the dos, Disp: , Rfl:    Date Last Reviewed:  1/3/18   EXAMINATION:   Observation: mild to moderate swelling of the right hand and wrist. Significant compensatory movements at the shoulder and elbow during demonstration of wrist exercises.  Incision scar healed  Palpation: adhesions along the incision scar  ROM: pain free                                     ROM:   At evaluation:   R wrist              Flex- 40 degrees              Ext-  20 degrees              Pronation- 60 degrees              Supination- 45 degrees            Strength:          Not tested today  Edema/Girth:   (Girth around distal metacarpals): n/t   Left Right    Initial Most Recent Initial Most Recent   Upper  Extremity  19 cm R   18 cm L         Lower  Extremity            Minimal swelling in fingers , mod amount of swelling in hand   Body Structures Involved:  1. Bones  2. Joints  3. Muscles Body Functions Affected:  1. Neuromusculoskeletal Activities and Participation Affected:  1. Self Care   CLINICAL DECISION MAKING:   Outcome Measure: Tool Used: Disabilities of the Arm, Shoulder and Hand (DASH) Questionnaire - Quick Version  Score:  Initial: 20/55  Most Recent: X/55 (Date: -- )   Interpretation of Score: The DASH is designed to measure the activities of daily living in person's with upper extremity dysfunction or pain. Each section is scored on a 1-5 scale, 5 representing the greatest disability. The scores of each section are added together for a total score of 55. Score 11 12-19 20-28 29-37 38-45 46-54 55   Modifier CH CI CJ CK CL CM CN     ? Carrying, Moving, and Handling Objects:     - CURRENT STATUS: CJ - 20%-39% impaired, limited or restricted    - GOAL STATUS: CI - 1%-19% impaired, limited or restricted    - D/C STATUS:  ---------------To be determined---------------      Medical Necessity:   · Patient is expected to demonstrate progress in strength and range of motion to return to function at home and at work. .  Reason for Services/Other Comments:  · Patient continues to require skilled intervention due to requiing therapy for guided rehab following fracture and surgery. .            TREATMENT:   (In addition to Assessment/Re-Assessment sessions the following treatments were rendered)  Pre-treatment Symptoms/Complaints: No pain. States she is using her hand to cook and serve food, and to color. She thinks she is able to do most things now that she normally would. Pain: Initial:   Pain Intensity 1: 0    Post Session:  No pain at end of session. Manual therapy (15 minutes) - for motion - grade 2 to 4- physio mobs R wrist flex and extn, radial and ulnar deviation   Therapeutic Exercise: (10 Minutes):  Exercises per grid below to improve mobility and strength. Required moderate visual and verbal cues to ensure correct performance. Progressed resistance and complexity of movement as indicated. Date:  12/28 Date:  1/9 Date:  1/11 Date  1/16 Date  1/23/18 Date  1/25/18   Activity/Exercise Parameters Parameters Parameters      Wrist ext/flex 10x 10x 10 With red t band x10 - -   Supination/pronation 10x 10x 10 Red t band x 10 - -   Gripping and hand/finger ex 10x 10x 10  - -   Wrist flex     1# 2x15 2# 2x10   Wrist extn      1# 2x15 2# 2x10   Wrist radial deviation     1# 2x15 2# 2x10   Pronation/supination     1# 2x15 2# 2x10   gripper     Red 1 x10 Red 1x10   Elbow flexion      2# 2x10   Modified wall push ups      2x10   HEP- continue current HEP  Modalities: ( 0 minutes): none today  Treatment/Session Assessment:    · Response to Treatment:  Patient reported that wrist felt good again at end of session. No increase in pain. Feels she could be ready for discharge from PT soon as she is doing most of her usual activities at home. · Compliance with Program/Exercises: yes  · Recommendations/Intent for next treatment session: \"Next visit will focus on ROM and beginning strengthening for wrist.\".   Total Treatment Duration: 25  minutes  PT Patient Time In/Time Out  Time In: 1115  Time Out: 1111 [facility] Ranae Snellen

## 2018-01-30 ENCOUNTER — HOSPITAL ENCOUNTER (OUTPATIENT)
Dept: PHYSICAL THERAPY | Age: 83
Discharge: HOME | End: 2018-01-30
Payer: COMMERCIAL

## 2018-01-30 PROCEDURE — 97140 MANUAL THERAPY 1/> REGIONS: CPT

## 2018-01-30 PROCEDURE — 97110 THERAPEUTIC EXERCISES: CPT

## 2018-01-30 NOTE — PROGRESS NOTES
Tanisha Hilliard  : 1934  [facility]  Secondary:  [address]  at [facility]  1101 [facility], [address] [place] [address],[address], [place] [address].  [phone]   [phone]       OUTPATIENT PHYSICAL THERAPY:Daily Note 2018    ICD-10: Treatment Diagnosis: Other intraarticular fracture of lower end of right radius, sequela (S52.571S)  Precautions/Allergies:   Review of patient's allergies indicates no known allergies. Fall Risk Score: 5 (? 5 = High Risk)  MD Orders: Continue PT  2-3x/wk for 4 weeks (written 18) MEDICAL/REFERRING DIAGNOSIS:  S/P Orif RT Distal Radius    DATE OF ONSET: Dec 16th   REFERRING PHYSICIAN: Kristel Link MD  RETURN PHYSICIAN APPOINTMENT: 18     INITIAL ASSESSMENT:  Ms. Amari Aguirre presents after ORIF R radius. She is out of her splint and is here for therapy to regain function of her dominant hand. PROBLEM LIST (Impacting functional limitations):  1. Decreased Strength  2. Decreased Activity Tolerance  3. Decreased Flexibility/Joint Mobility  4. Edema/Girth INTERVENTIONS PLANNED:  1. Home Exercise Program (HEP)  2. Manual Therapy  3. Therapeutic Exercise/Strengthening   TREATMENT PLAN:  Effective Dates: 17 TO 3/28/17. Frequency/Duration: 2-3 times a week for 3 weeks  GOALS: (Goals have been discussed and agreed upon with patient.)  Short-Term Functional Goals: Time Frame: 3 weeks  1. Independent with HEP for deficits. 2. Improved ROM of wrist to WNL to work toward regaining function. Discharge Goals: Time Frame: 8 weeks Expect new orders on subsequent MD visits. 1. Wrist and hand strength WNL to return to previous function. 2. Pt to report no pain with daily activities. 3. Improve Dash by 10 points of greater to indicate improved function. Rehabilitation Potential For Stated Goals: Good  \            The information in this section was collected on 17 (except where otherwise noted).   HISTORY:   History of Present Injury/Illness (Reason for Referral):  Pt fractured radius 3 weeks ago and had surgery 12/16/17 and was released from splint 12/26/17. She fractured her wrist from a fall as her  lost his balance and feel into her and then she fell. Pt has not had much pain and she is pleased. She reports she is using her hand some but not lifting anything. Past Medical History/Comorbidities: from EMR:  Ms. Sade Jones  has a past medical history of Arthritis; Hypertension; and Right arm pain. Ms. Sade Jones  has a past surgical history that includes hx open cholecystectomy. Social History/Living Environment:     lives with  in home with stairs  Prior Level of Function/Work/Activity:  Independent with all activities. Currently pt is not driving. Dominant Side:         RIGHT  Current Medications:  From EMR:     Current Outpatient Prescriptions:     garlic cap, Take  by mouth. Indications: fruit and veggie vitamin with garlic- held 16/79/81, Disp: , Rfl:     acetaminophen (TYLENOL EXTRA STRENGTH) 500 mg tablet, Take 500 mg by mouth every six (6) hours as needed for Pain., Disp: , Rfl:     metoprolol succinate (TOPROL XL) 50 mg XL tablet, Take 50 mg by mouth daily. Indications: am- take on the dos, Disp: , Rfl:     UBIDECARENONE (CO Q-10 PO), Take 1 Tab by mouth daily. Indications: held 12/13/17, Disp: , Rfl:     aspirin delayed-release 81 mg tablet, Take 81 mg by mouth daily. Indications: am- take on the dos, Disp: , Rfl:     cholecalciferol, vitamin D3, (VITAMIN D3) 2,000 unit tab, Take 2,000 Units by mouth daily. Indications: held 12/13/17, Disp: , Rfl:     amLODIPine (NORVASC) 2.5 mg tablet, Take 2.5 mg by mouth daily. Indications: am- take on the dos, Disp: , Rfl:    Date Last Reviewed:  1/3/18   EXAMINATION:   Observation: mild to moderate swelling of the right hand and wrist. Significant compensatory movements at the shoulder and elbow during demonstration of wrist exercises.  Incision scar healed  Palpation: adhesions along the incision scar  ROM: pain free                                     ROM:   At evaluation:   R wrist              Flex- 40 degrees              Ext-  20 degrees              Pronation- 60 degrees              Supination- 45 degrees            Strength:          Not tested today  Edema/Girth:   (Girth around distal metacarpals): n/t   Left Right    Initial Most Recent Initial Most Recent   Upper  Extremity  19 cm R   18 cm L         Lower  Extremity            Minimal swelling in fingers , mod amount of swelling in hand   Body Structures Involved:  1. Bones  2. Joints  3. Muscles Body Functions Affected:  1. Neuromusculoskeletal Activities and Participation Affected:  1. Self Care   CLINICAL DECISION MAKING:   Outcome Measure: Tool Used: Disabilities of the Arm, Shoulder and Hand (DASH) Questionnaire - Quick Version  Score:  Initial: 20/55  Most Recent: X/55 (Date: -- )   Interpretation of Score: The DASH is designed to measure the activities of daily living in person's with upper extremity dysfunction or pain. Each section is scored on a 1-5 scale, 5 representing the greatest disability. The scores of each section are added together for a total score of 55. Score 11 12-19 20-28 29-37 38-45 46-54 55   Modifier CH CI CJ CK CL CM CN     ? Carrying, Moving, and Handling Objects:     - CURRENT STATUS: CJ - 20%-39% impaired, limited or restricted    - GOAL STATUS: CI - 1%-19% impaired, limited or restricted    - D/C STATUS:  ---------------To be determined---------------      Medical Necessity:   · Patient is expected to demonstrate progress in strength and range of motion to return to function at home and at work. .  Reason for Services/Other Comments:  · Patient continues to require skilled intervention due to requiing therapy for guided rehab following fracture and surgery. .            TREATMENT:   (In addition to Assessment/Re-Assessment sessions the following treatments were rendered)  Pre-treatment Symptoms/Complaints: No pain. She reports she is doing well. Pain: Initial:        Post Session:  No pain at end of session. Manual therapy (15 minutes) - for motion - grade 2 to 4- physio mobs R wrist flex and extn, radial and ulnar deviation   Therapeutic Exercise: ( ):  Exercises per grid below to improve mobility and strength. Required moderate visual and verbal cues to ensure correct performance. Progressed resistance and complexity of movement as indicated. Date:  12/28 Date:  1/9 Date:  1/11 Date  1/16 Date  1/23/18 Date  1/25/18 Date  1/20   Activity/Exercise Parameters Parameters Parameters       Wrist ext/flex 10x 10x 10 With red t band x10 - -    Supination/pronation 10x 10x 10 Red t band x 10 - -    Gripping and hand/finger ex 10x 10x 10  - -    Wrist flex     1# 2x15 2# 2x10 2# 2 x 10   Wrist extn      1# 2x15 2# 2x10 2# 2 x 10   Wrist radial deviation     1# 2x15 2# 2x10 2# 2 x10   Pronation/supination     1# 2x15 2# 2x10 2# 2 x 10   gripper     Red 1 x10 Red 1x10 Red 1 x 10   Elbow flexion      2# 2x10 3# 2 x 10   Modified wall push ups      2x10 2 x 10   HEP- continue current HEP  Modalities: ( 0 minutes): none today  Treatment/Session Assessment:    · Response to Treatment:  Pt continues to do well. · Compliance with Program/Exercises: yes  · Recommendations/Intent for next treatment session: \"Next visit will focus on ROM and beginning strengthening for wrist.\".   Total Treatment Duration: 30  minutes  PT Patient Time In/Time Out  Time In: 0100  Time Out: 0130    Minnie Reardon, PT

## 2018-02-01 ENCOUNTER — HOSPITAL ENCOUNTER (OUTPATIENT)
Dept: PHYSICAL THERAPY | Age: 83
Discharge: HOME | End: 2018-02-01
Payer: COMMERCIAL

## 2018-02-01 PROCEDURE — 97110 THERAPEUTIC EXERCISES: CPT

## 2018-02-01 PROCEDURE — 97140 MANUAL THERAPY 1/> REGIONS: CPT

## 2018-02-06 ENCOUNTER — HOSPITAL ENCOUNTER (OUTPATIENT)
Dept: PHYSICAL THERAPY | Age: 83
Discharge: HOME | End: 2018-02-06
Payer: COMMERCIAL

## 2018-02-06 PROCEDURE — G8985 CARRY GOAL STATUS: HCPCS

## 2018-02-06 PROCEDURE — G8984 CARRY CURRENT STATUS: HCPCS

## 2018-02-06 PROCEDURE — 97110 THERAPEUTIC EXERCISES: CPT

## 2018-02-06 PROCEDURE — 97140 MANUAL THERAPY 1/> REGIONS: CPT

## 2018-02-06 NOTE — PROGRESS NOTES
Tanisha Hilliard  : 1934  [facility]  Secondary:  [address] at [facility] [place]  1101 [facility], [address] [place] [address] [address], [address] [facility]  [phone]   [phone]       OUTPATIENT PHYSICAL THERAPY:Daily Note and Progress Report 2018    ICD-10: Treatment Diagnosis: Other intraarticular fracture of lower end of right radius, sequela (S52.571S)  Precautions/Allergies:   Review of patient's allergies indicates no known allergies. Fall Risk Score: 5 (? 5 = High Risk)  MD Orders: Continue PT  2-3x/wk for 4 weeks (written 18) MEDICAL/REFERRING DIAGNOSIS:  S/P Orif RT Distal Radius    DATE OF ONSET: Dec 16th   REFERRING PHYSICIAN: Priscilla Ivy MD  RETURN PHYSICIAN APPOINTMENT: 18     INITIAL ASSESSMENT:  Ms. Cyndie Allison presents after ORIF R radius. She is out of her splint and is here for therapy to regain function of her dominant hand. PROBLEM LIST (Impacting functional limitations):  1. Decreased Strength  2. Decreased Activity Tolerance  3. Decreased Flexibility/Joint Mobility  4. Edema/Girth INTERVENTIONS PLANNED:  1. Home Exercise Program (HEP)  2. Manual Therapy  3. Therapeutic Exercise/Strengthening   TREATMENT PLAN:  Effective Dates: 17 TO 3/28/17. Frequency/Duration: 2-3 times a week for 3 weeks  GOALS: (Goals have been discussed and agreed upon with patient.)  Short-Term Functional Goals: Time Frame: 3 weeks  1. Independent with HEP for deficits. MET  2. Improved ROM of wrist to WNL to work toward regaining function. MET  Discharge Goals: Time Frame: 8 weeks Expect new orders on subsequent MD visits. 1. Wrist and hand strength WNL to return to previous function. MET  2. Pt to report no pain with daily activities. MET  3. Improve Dash by 10 points of greater to indicate improved function. Improved by 8 points.   Rehabilitation Potential For Stated Goals: Good  \            The information in this section was collected on 17 (except where otherwise noted). HISTORY:   History of Present Injury/Illness (Reason for Referral):  Pt fractured radius 3 weeks ago and had surgery 12/16/17 and was released from splint 12/26/17. She fractured her wrist from a fall as her  lost his balance and feel into her and then she fell. Pt has not had much pain and she is pleased. She reports she is using her hand some but not lifting anything. Past Medical History/Comorbidities: from EMR:  Ms. Romario Oakes  has a past medical history of Arthritis; Hypertension; and Right arm pain. Ms. Romario Oakes  has a past surgical history that includes hx open cholecystectomy. Social History/Living Environment:     lives with  in home with stairs  Prior Level of Function/Work/Activity:  Independent with all activities. Currently pt is not driving. Dominant Side:         RIGHT  Current Medications:  From EMR:     Current Outpatient Prescriptions:     garlic cap, Take  by mouth. Indications: fruit and veggie vitamin with garlic- held 52/60/05, Disp: , Rfl:     acetaminophen (TYLENOL EXTRA STRENGTH) 500 mg tablet, Take 500 mg by mouth every six (6) hours as needed for Pain., Disp: , Rfl:     metoprolol succinate (TOPROL XL) 50 mg XL tablet, Take 50 mg by mouth daily. Indications: am- take on the dos, Disp: , Rfl:     UBIDECARENONE (CO Q-10 PO), Take 1 Tab by mouth daily. Indications: held 12/13/17, Disp: , Rfl:     aspirin delayed-release 81 mg tablet, Take 81 mg by mouth daily. Indications: am- take on the dos, Disp: , Rfl:     cholecalciferol, vitamin D3, (VITAMIN D3) 2,000 unit tab, Take 2,000 Units by mouth daily. Indications: held 12/13/17, Disp: , Rfl:     amLODIPine (NORVASC) 2.5 mg tablet, Take 2.5 mg by mouth daily.  Indications: am- take on the dos, Disp: , Rfl:    Date Last Reviewed:  2/6/2018   EXAMINATION:   Observation: mild to moderate swelling of the right hand and wrist. Significant compensatory movements at the shoulder and elbow during demonstration of wrist exercises. Incision scar healed  Palpation: adhesions along the incision scar  ROM: pain free                                     ROM:   At evaluation:   R wrist              Flex- 40 degrees              Ext-  20 degrees              Pronation- 60 degrees              Supination- 45 degrees            Strength:          Not tested today  Edema/Girth:   (Girth around distal metacarpals): n/t   Left Right    Initial Most Recent Initial Most Recent   Upper  Extremity  19 cm R   18 cm L         Lower  Extremity            Minimal swelling in fingers , mod amount of swelling in hand   Body Structures Involved:  1. Bones  2. Joints  3. Muscles Body Functions Affected:  1. Neuromusculoskeletal Activities and Participation Affected:  1. Self Care   CLINICAL DECISION MAKING:   Outcome Measure: Tool Used: Disabilities of the Arm, Shoulder and Hand (DASH) Questionnaire - Quick Version  Score:  Initial: 20/55  Most Recent: X/55 (Date:  2/6/18-  12/55 -- )   Interpretation of Score: The DASH is designed to measure the activities of daily living in person's with upper extremity dysfunction or pain. Each section is scored on a 1-5 scale, 5 representing the greatest disability. The scores of each section are added together for a total score of 55. Score 11 12-19 20-28 29-37 38-45 46-54 55   Modifier CH CI CJ CK CL CM CN     ? Carrying, Moving, and Handling Objects:     - CURRENT STATUS: CI - 1%-19% impaired, limited or restricted    - GOAL STATUS: CI - 1%-19% impaired, limited or restricted    - D/C STATUS:  ---------------To be determined---------------      Medical Necessity:   · Patient is expected to demonstrate progress in strength and range of motion to return to function at home and at work. .  Reason for Services/Other Comments:  · Patient continues to require skilled intervention due to requiing therapy for guided rehab following fracture and surgery. .            TREATMENT:   (In addition to Assessment/Re-Assessment sessions the following treatments were rendered)  Pre-treatment Symptoms/Complaints: No pain. Pt states she can do all of her activities at home without any difficulty. Pain: Initial:        0/10 Post Session:  No pain at end of session. Manual therapy (15 minutes) - for motion - grade 2 to 4- physio mobs R wrist flex and extn, radial and ulnar deviation   Therapeutic Exercise: ( ):  Exercises per grid below to improve mobility and strength. Required moderate visual and verbal cues to ensure correct performance. Progressed resistance and complexity of movement as indicated. Date  2/6/18   Activity/Exercise    Wrist ext/flex    Supination/pronation    Gripping and hand/finger ex    Wrist flex 3# 2 x 10   Wrist extn  3# 2 x 10   Wrist radial deviation 3# 2 x10   Pronation/supination 3# 2 x 10   gripper Red 1 x 10   Elbow flexion 3# 2 x 10   Modified wall push ups 2 x 10   HEP- continue current HEP  Modalities: ( 0 minutes): none today  Treatment/Session Assessment:    · Response to Treatment:  Pt continues to do well. Will cancel her next appt on Thursday and recheck on Tuesday before her MD appointment. She has met all of her goals except 1.  · Compliance with Program/Exercises: yes  · Recommendations/Intent for next treatment session: \"Next visit will focus on ROM and strengthening for wrist.\".   Total Treatment Duration: 30  minutes  PT Patient Time In/Time Out  Time In: 0100  Time Out: 0130    Roxanne Bamberger, PT

## 2018-02-08 ENCOUNTER — HOSPITAL ENCOUNTER (OUTPATIENT)
Dept: PHYSICAL THERAPY | Age: 83
Discharge: HOME | End: 2018-02-08
Payer: COMMERCIAL

## 2018-02-08 NOTE — PROGRESS NOTES
[facility] and I had agreed to cancel this appointment and keep one for Tuesday- anticipating discharge after appointment on Tuesday.

## 2018-02-13 ENCOUNTER — HOSPITAL ENCOUNTER (OUTPATIENT)
Dept: PHYSICAL THERAPY | Age: 83
Discharge: HOME | End: 2018-02-13
Payer: COMMERCIAL

## 2018-02-13 PROCEDURE — G8985 CARRY GOAL STATUS: HCPCS

## 2018-02-13 PROCEDURE — 97110 THERAPEUTIC EXERCISES: CPT

## 2018-02-13 PROCEDURE — G8986 CARRY D/C STATUS: HCPCS

## 2018-02-13 NOTE — PROGRESS NOTES
Tanisha Hilliard  : 1934  [facility]  Secondary:  [address]  [address]  1101 [address] [facility], [address] [place] [address],[address], [place] [address].  [phone]   [phone]       OUTPATIENT PHYSICAL THERAPY:Daily Note and Discharge 2018    ICD-10: Treatment Diagnosis: Other intraarticular fracture of lower end of right radius, sequela (S52.571S)  Precautions/Allergies:   Review of patient's allergies indicates no known allergies. Fall Risk Score: 5 (? 5 = High Risk)  MD Orders: Continue PT  2-3x/wk for 4 weeks (written 18) MEDICAL/REFERRING DIAGNOSIS:  S/P Orif RT Distal Radius    DATE OF ONSET: Dec 16th   REFERRING PHYSICIAN: Matias Rosa MD  RETURN PHYSICIAN APPOINTMENT: 18     INITIAL ASSESSMENT:  Ms. Romario Oakes has completed her PT order and states she is ready to return work at [employer]. She has met all her goals. PROBLEM LIST (Impacting functional limitations):  1. Decreased Strength  2. Decreased Activity Tolerance  3. Decreased Flexibility/Joint Mobility  4. Edema/Girth INTERVENTIONS PLANNED:  1. Home Exercise Program (HEP)  2. Manual Therapy  3. Therapeutic Exercise/Strengthening   TREATMENT PLAN:  Effective Dates: 17 TO 3/28/17. Frequency/Duration: 2-3 times a week for 3 weeks  GOALS: (Goals have been discussed and agreed upon with patient.)  Short-Term Functional Goals: Time Frame: 3 weeks  1. Independent with HEP for deficits. MET  2. Improved ROM of wrist to WNL to work toward regaining function. MET  Discharge Goals: Time Frame: 8 weeks Expect new orders on subsequent MD visits. 1. Wrist and hand strength WNL to return to previous function. MET  2. Pt to report no pain with daily activities. MET  3. Improve Dash by 10 points of greater to indicate improved function. MET  Rehabilitation Potential For Stated Goals: Good  \            The information in this section was collected on 17 (except where otherwise noted).   HISTORY: History of Present Injury/Illness (Reason for Referral):  Pt fractured radius 3 weeks ago and had surgery 12/16/17 and was released from splint 12/26/17. She fractured her wrist from a fall as her  lost his balance and feel into her and then she fell. Pt has not had much pain and she is pleased. She reports she is using her hand some but not lifting anything. Past Medical History/Comorbidities: from EMR:  Ms. Joe Liu  has a past medical history of Arthritis; Hypertension; and Right arm pain. Ms. Joe Liu  has a past surgical history that includes hx open cholecystectomy. Social History/Living Environment:     lives with  in home with stairs  Prior Level of Function/Work/Activity:  Independent with all activities. Currently pt is not driving. Dominant Side:         RIGHT  Current Medications:  From EMR:     Current Outpatient Prescriptions:     garlic cap, Take  by mouth. Indications: fruit and veggie vitamin with garlic- held 09/04/04, Disp: , Rfl:     acetaminophen (TYLENOL EXTRA STRENGTH) 500 mg tablet, Take 500 mg by mouth every six (6) hours as needed for Pain., Disp: , Rfl:     metoprolol succinate (TOPROL XL) 50 mg XL tablet, Take 50 mg by mouth daily. Indications: am- take on the dos, Disp: , Rfl:     UBIDECARENONE (CO Q-10 PO), Take 1 Tab by mouth daily. Indications: held 12/13/17, Disp: , Rfl:     aspirin delayed-release 81 mg tablet, Take 81 mg by mouth daily. Indications: am- take on the dos, Disp: , Rfl:     cholecalciferol, vitamin D3, (VITAMIN D3) 2,000 unit tab, Take 2,000 Units by mouth daily. Indications: held 12/13/17, Disp: , Rfl:     amLODIPine (NORVASC) 2.5 mg tablet, Take 2.5 mg by mouth daily. Indications: am- take on the dos, Disp: , Rfl:    Date Last Reviewed:  2/13/2018   EXAMINATION:   Observation: mild to moderate swelling of the right hand and wrist. Significant compensatory movements at the shoulder and elbow during demonstration of wrist exercises.  Incision scar healed  Palpation: adhesions along the incision scar  ROM: pain free                                     ROM: 2/13/18:   R wrist              Flex- 70 degrees              Ext-  45 degrees              Pronation- 60 degrees              Supination- 45 degrees            Strength: Body Structures Involved:  1. Bones  2. Joints  3. Muscles Body Functions Affected:  1. Neuromusculoskeletal Activities and Participation Affected:  1. Self Care   CLINICAL DECISION MAKING:   Outcome Measure: Tool Used: Disabilities of the Arm, Shoulder and Hand (DASH) Questionnaire - Quick Version  Score:  Initial: 20/55  Most Recent: X/55 (Date:  2/6/18 8/55 -- )   Interpretation of Score: The DASH is designed to measure the activities of daily living in person's with upper extremity dysfunction or pain. Each section is scored on a 1-5 scale, 5 representing the greatest disability. The scores of each section are added together for a total score of 55. Score 11 12-19 20-28 29-37 38-45 46-54 55   Modifier CH CI CJ CK CL CM CN     ? Carrying, Moving, and Handling Objects:     - CURRENT STATUS: CI - 1%-19% impaired, limited or restricted    - GOAL STATUS: CI - 1%-19% impaired, limited or restricted    - D/C STATUS:  CI - 1%-19% impaired, limited or restricted      Medical Necessity:   · Patient is expected to demonstrate progress in strength and range of motion to return to function at home and at work. .  Reason for Services/Other Comments:  · Patient continues to require skilled intervention due to requiing therapy for guided rehab following fracture and surgery. .            TREATMENT:   (In addition to Assessment/Re-Assessment sessions the following treatments were rendered)  Pre-treatment Symptoms/Complaints: No pain. Pt states she can do all of her activities at home without any difficulty. Pain: Initial:        0/10 Post Session:  No pain at end of session.       Manual therapy (15 minutes) - for motion - grade 2 to 4- physio mobs R wrist flex and extn, radial and ulnar deviation   Therapeutic Exercise: ( ):  Exercises per grid below to improve mobility and strength. Required moderate visual and verbal cues to ensure correct performance. Progressed resistance and complexity of movement as indicated. Date  2/13/18   Activity/Exercise    Wrist ext/flex    Supination/pronation    Gripping and hand/finger ex    Wrist flex 3# 2 x 10   Wrist extn  3# 2 x 10   Wrist radial deviation 3# 2 x10   Pronation/supination 3# 2 x 10   gripper Red 1 x 10   Elbow flexion 3# 2 x 10   Modified wall push ups 2 x 10   HEP- continue current HEP  Modalities: ( 0 minutes): none today  Treatment/Session Assessment:    · Response to Treatment:  Pt continues to do well. She has met all of her goals. · Compliance with Program/Exercises: yes  · Recommendations/Intent for next treatment session: Discharge today.   Total Treatment Duration: 30  minutes  PT Patient Time In/Time Out  Time In: 0115  Time Out: Yamileth Morocho, PT

## 2023-10-11 ENCOUNTER — OFFICE VISIT (OUTPATIENT)
Dept: ORTHOPEDIC SURGERY | Age: 88
End: 2023-10-11

## 2023-10-11 DIAGNOSIS — M25.561 RIGHT KNEE PAIN, UNSPECIFIED CHRONICITY: Primary | ICD-10-CM

## 2023-10-11 DIAGNOSIS — M17.11 ARTHRITIS OF RIGHT KNEE: ICD-10-CM

## 2023-10-11 RX ORDER — TRIAMCINOLONE ACETONIDE 40 MG/ML
40 INJECTION, SUSPENSION INTRA-ARTICULAR; INTRAMUSCULAR ONCE
Status: COMPLETED | OUTPATIENT
Start: 2023-10-11 | End: 2023-10-11

## 2023-10-11 RX ADMIN — TRIAMCINOLONE ACETONIDE 40 MG: 40 INJECTION, SUSPENSION INTRA-ARTICULAR; INTRAMUSCULAR at 10:50

## 2023-11-13 DIAGNOSIS — M17.11 ARTHRITIS OF RIGHT KNEE: Primary | ICD-10-CM

## 2023-11-15 ENCOUNTER — OFFICE VISIT (OUTPATIENT)
Dept: ORTHOPEDIC SURGERY | Age: 88
End: 2023-11-15

## 2023-11-15 DIAGNOSIS — M17.11 ARTHRITIS OF RIGHT KNEE: Primary | ICD-10-CM

## 2023-11-15 NOTE — PROGRESS NOTES
Name: Anirudh Holbrook  YOB: 1934  Gender: female  MRN: [id number]    CC:   Chief Complaint   Patient presents with    Follow-up     Right knee #1 Synvisc        HPI:  The right knee pain has been relieved with the IA cortisone injection performed on 11/11/23. They are more mobile and happy. The constant ache is gone. Function is improved. No new complaints. ROS:  As per HPI. Pertinent postives and negatives are addressed with the patient, particularly those related to musculoskeletal concerns. Non-orthopaedic concerns were referred back to the primary care physician. [id number] [facility]:    Current Outpatient Medications:     acetaminophen (TYLENOL) 500 MG tablet, Take 500 mg by mouth every 6 hours as needed, Disp: , Rfl:     amLODIPine (NORVASC) 2.5 MG tablet, Take 2.5 mg by mouth daily, Disp: , Rfl:     aspirin 81 MG EC tablet, Take 81 mg by mouth daily, Disp: , Rfl:     Cholecalciferol 50 MCG (2000 UT) TABS, Take 2,000 Units by mouth daily, Disp: , Rfl:     metoprolol succinate (TOPROL XL) 50 MG extended release tablet, Take 50 mg by mouth daily, Disp: , Rfl:   Not on File  Past Medical History:   Diagnosis Date    Arthritis     Hypertension     controlled well with meds    Right arm pain      . pmh  Past Surgical History:   Procedure Laterality Date    CHOLECYSTECTOMY, OPEN       Family History   Problem Relation Age of Onset    Stroke Brother     Hypertension Mother     Heart Disease Mother     Hypertension Father     Stroke Father     Cancer Sister         Lymphoma    Cancer Brother         Lung CA    Heart Disease Sister     Heart Disease Father      Social History     Socioeconomic History    Marital status:      Spouse name: Not on file    Number of children: Not on file    Years of education: Not on file    Highest education level: Not on file   Occupational History    Not on file   Tobacco Use    Smoking status: Never    Smokeless tobacco: Never   Substance and Sexual Activity    Alcohol

## 2024-07-24 ENCOUNTER — OFFICE VISIT (OUTPATIENT)
Dept: ORTHOPEDIC SURGERY | Age: 89
End: 2024-07-24
Payer: MEDICARE

## 2024-07-24 DIAGNOSIS — M17.11 ARTHRITIS OF RIGHT KNEE: Primary | ICD-10-CM

## 2024-07-24 PROCEDURE — 20611 DRAIN/INJ JOINT/BURSA W/US: CPT | Performed by: ORTHOPAEDIC SURGERY

## 2024-07-24 RX ORDER — TRIAMCINOLONE ACETONIDE 40 MG/ML
40 INJECTION, SUSPENSION INTRA-ARTICULAR; INTRAMUSCULAR ONCE
Status: COMPLETED | OUTPATIENT
Start: 2024-07-24 | End: 2024-07-24

## 2024-07-24 RX ADMIN — TRIAMCINOLONE ACETONIDE 40 MG: 40 INJECTION, SUSPENSION INTRA-ARTICULAR; INTRAMUSCULAR at 08:24

## 2024-07-24 NOTE — PROGRESS NOTES
Name: Michelle Saunders  YOB: 1934  Gender: female  MRN: [id number]      Current Outpatient Medications:     acetaminophen (TYLENOL) 500 MG tablet, Take 500 mg by mouth every 6 hours as needed, Disp: , Rfl:     amLODIPine (NORVASC) 2.5 MG tablet, Take 2.5 mg by mouth daily, Disp: , Rfl:     aspirin 81 MG EC tablet, Take 81 mg by mouth daily, Disp: , Rfl:     Cholecalciferol 50 MCG (2000 UT) TABS, Take 2,000 Units by mouth daily, Disp: , Rfl:     metoprolol succinate (TOPROL XL) 50 MG extended release tablet, Take 50 mg by mouth daily, Disp: , Rfl:   Not on File    CC: Right knee pain.  She has done well with injections in the past    Impression: Osteoarthritis the right knee    Procedure: Kenalog injection with US guidance    Radiology Report:  [employer] US unit with a variable frequency (6.0-15.0 MHz) linear transducer was used to examine the intracondylar notch, retropatellar fat pad, patella tendon, patella, and tibia as well as to ensure adequate needle placement.  Injection image was obtained and placed in the patient's permanent chart.    Procedure Note: Time out was performed which included identifying the patient by name and date of birth.  The procedure site was identified with all present in agreement.  The right knee was prepped with alcohol. Then, under GE ultrasound guidance, the right knee was injected with 2 mL of 0.5% Marcaine and 40 mg of Kenalog. The patient tolerated the procedure without difficulty.    Disposition: They are to return as scheduled.

## 2025-04-10 ENCOUNTER — TELEPHONE (OUTPATIENT)
Dept: ORTHOPEDIC SURGERY | Age: 89
End: 2025-04-10

## 2025-04-10 NOTE — TELEPHONE ENCOUNTER
Can you  work  this pt in on a Wed in the  morning at   sometime  for  a recheck of  her  right knee?    Sometime  before May...

## 2025-05-07 ENCOUNTER — OFFICE VISIT (OUTPATIENT)
Dept: ORTHOPEDIC SURGERY | Age: 89
End: 2025-05-07

## 2025-05-07 DIAGNOSIS — M17.11 ARTHRITIS OF RIGHT KNEE: Primary | ICD-10-CM

## 2025-05-07 RX ORDER — TRIAMCINOLONE ACETONIDE 40 MG/ML
40 INJECTION, SUSPENSION INTRA-ARTICULAR; INTRAMUSCULAR ONCE
Status: COMPLETED | OUTPATIENT
Start: 2025-05-07 | End: 2025-05-07

## 2025-05-07 RX ADMIN — TRIAMCINOLONE ACETONIDE 40 MG: 40 INJECTION, SUSPENSION INTRA-ARTICULAR; INTRAMUSCULAR at 09:18

## 2025-05-07 NOTE — PROGRESS NOTES
Name: Michelle Saunders  YOB: 1934  Gender: female  MRN: [id number]    CC:   Chief Complaint   Patient presents with    Knee Pain     Right knee-getting xrays today         DIAGNOSIS:   Encounter Diagnosis   Name Primary?    Arthritis of right knee Yes        HPI:   The right knee pain has been present for several weeks and is becoming worse.  It does not hurt at night when sleeping.  The pain is located over the right knee.  It does hurt to walk and gets worse with increased distances.   The pain does not radiate down the leg.  Numbness and tingling are not noted.   Treatment so far has been Tylenol and IA cortisone which has continued to provide long-lasting relief.      Current Outpatient Medications:     acetaminophen (TYLENOL) 500 MG tablet, Take 500 mg by mouth every 6 hours as needed, Disp: , Rfl:     amLODIPine (NORVASC) 2.5 MG tablet, Take 2.5 mg by mouth daily, Disp: , Rfl:     aspirin 81 MG EC tablet, Take 81 mg by mouth daily, Disp: , Rfl:     Cholecalciferol 50 MCG (2000 UT) TABS, Take 2,000 Units by mouth daily, Disp: , Rfl:     metoprolol succinate (TOPROL XL) 50 MG extended release tablet, Take 50 mg by mouth daily, Disp: , Rfl:   Not on File  Past Medical History:   Diagnosis Date    Arthritis     Hypertension     controlled well with meds    Right arm pain      .pmh  Past Surgical History:   Procedure Laterality Date    CHOLECYSTECTOMY, OPEN       Family History   Problem Relation Age of Onset    Stroke Brother     Hypertension Mother     Heart Disease Mother     Hypertension Father     Stroke Father     Cancer Sister         Lymphoma    Cancer Brother         Lung CA    Heart Disease Sister     Heart Disease Father      Social History     Socioeconomic History    Marital status:      Spouse name: Not on file    Number of children: Not on file    Years of education: Not on file    Highest education level: Not on file   Occupational History    Not on file   Tobacco Use    Smoking

## (undated) DEVICE — STERILE HOOK LOCK LATEX FREE ELASTIC BANDAGE 4INX5YD: Brand: HOOK LOCK™

## (undated) DEVICE — INTENDED FOR TISSUE SEPARATION, AND OTHER PROCEDURES THAT REQUIRE A SHARP SURGICAL BLADE TO PUNCTURE OR CUT.: Brand: BARD-PARKER ® STAINLESS STEEL BLADES

## (undated) DEVICE — X-LARGE COTTON GLOVE: Brand: DEROYAL

## (undated) DEVICE — BUTTON SWITCH PENCIL BLADE ELECTRODE, HOLSTER: Brand: EDGE

## (undated) DEVICE — STERILE LATEX POWDER-FREE SURGICAL GLOVESWITH NITRILE COATING: Brand: PROTEXIS

## (undated) DEVICE — PADDING CAST COHESIVE 4 YDX3 IN HND TEARABLE COTTON SPEC 100

## (undated) DEVICE — GLOVE SURG SZ 8.5 L11.6IN FNGR THK12.6MIL CUF THK8.3MIL BRN

## (undated) DEVICE — Device

## (undated) DEVICE — AMD ANTIMICROBIAL GAUZE SPONGES,12 PLY USP TYPE VII, 0.2% POLYHEXAMETHYLENE BIGUANIDE HCI (PHMB): Brand: CURITY

## (undated) DEVICE — REM POLYHESIVE ADULT PATIENT RETURN ELECTRODE: Brand: VALLEYLAB

## (undated) DEVICE — SUT ETHLN 4-0 18IN PS2 BLK --

## (undated) DEVICE — SOLUTION IV 1000ML 0.9% SOD CHL

## (undated) DEVICE — 2000CC GUARDIAN II: Brand: GUARDIAN

## (undated) DEVICE — TAPE CST FST SET 3INX3YD BLU --

## (undated) DEVICE — (D)PREP SKN CHLRAPRP APPL 26ML -- CONVERT TO ITEM 371833

## (undated) DEVICE — SUTURE MCRYL SZ 2-0 L27IN ABSRB VLT CT-2 L26MM 1/2 CIR Y333H

## (undated) DEVICE — 1.8MM DRILL BIT WITH DEPTH MARK/QC/110MM